# Patient Record
Sex: FEMALE | Race: WHITE
[De-identification: names, ages, dates, MRNs, and addresses within clinical notes are randomized per-mention and may not be internally consistent; named-entity substitution may affect disease eponyms.]

---

## 2018-12-10 ENCOUNTER — HOSPITAL ENCOUNTER (OUTPATIENT)
Dept: HOSPITAL 97 - ER | Age: 53
Setting detail: OBSERVATION
LOS: 2 days | Discharge: HOME HEALTH | End: 2018-12-12
Attending: INTERNAL MEDICINE | Admitting: FAMILY MEDICINE
Payer: COMMERCIAL

## 2018-12-10 VITALS — BODY MASS INDEX: 26.2 KG/M2

## 2018-12-10 DIAGNOSIS — E87.1: ICD-10-CM

## 2018-12-10 DIAGNOSIS — F10.10: ICD-10-CM

## 2018-12-10 DIAGNOSIS — F41.8: ICD-10-CM

## 2018-12-10 DIAGNOSIS — F17.210: ICD-10-CM

## 2018-12-10 DIAGNOSIS — K76.0: ICD-10-CM

## 2018-12-10 DIAGNOSIS — G92: Primary | ICD-10-CM

## 2018-12-10 DIAGNOSIS — E46: ICD-10-CM

## 2018-12-10 DIAGNOSIS — E87.6: ICD-10-CM

## 2018-12-10 DIAGNOSIS — K21.9: ICD-10-CM

## 2018-12-10 DIAGNOSIS — I10: ICD-10-CM

## 2018-12-10 DIAGNOSIS — J44.9: ICD-10-CM

## 2018-12-10 LAB
ALBUMIN SERPL BCP-MCNC: 3.9 G/DL (ref 3.4–5)
ALP SERPL-CCNC: 82 U/L (ref 45–117)
ALT SERPL W P-5'-P-CCNC: 21 U/L (ref 12–78)
AST SERPL W P-5'-P-CCNC: 18 U/L (ref 15–37)
BUN BLD-MCNC: 10 MG/DL (ref 7–18)
GLUCOSE SERPLBLD-MCNC: 85 MG/DL (ref 74–106)
HCT VFR BLD CALC: 33.6 % (ref 36–45)
LIPASE SERPL-CCNC: 403 U/L (ref 73–393)
LYMPHOCYTES # SPEC AUTO: 1.4 K/UL (ref 0.7–4.9)
MAGNESIUM SERPL-MCNC: 1.5 MG/DL (ref 1.8–2.4)
MCH RBC QN AUTO: 34.7 PG (ref 27–35)
MCV RBC: 97.1 FL (ref 80–100)
METHAMPHET UR QL SCN: NEGATIVE
PMV BLD: 7.2 FL (ref 7.6–11.3)
POTASSIUM SERPL-SCNC: 3.3 MMOL/L (ref 3.5–5.1)
RBC # BLD: 3.47 M/UL (ref 3.86–4.86)
THC SERPL-MCNC: NEGATIVE NG/ML
TROPONIN (EMERG DEPT USE ONLY): < 0.02 NG/ML (ref 0–0.04)
UA COMPLETE W REFLEX CULTURE PNL UR: (no result)

## 2018-12-10 PROCEDURE — 76700 US EXAM ABDOM COMPLETE: CPT

## 2018-12-10 PROCEDURE — 83735 ASSAY OF MAGNESIUM: CPT

## 2018-12-10 PROCEDURE — 36415 COLL VENOUS BLD VENIPUNCTURE: CPT

## 2018-12-10 PROCEDURE — 97162 PT EVAL MOD COMPLEX 30 MIN: CPT

## 2018-12-10 PROCEDURE — 93005 ELECTROCARDIOGRAM TRACING: CPT

## 2018-12-10 PROCEDURE — 99285 EMERGENCY DEPT VISIT HI MDM: CPT

## 2018-12-10 PROCEDURE — 80307 DRUG TEST PRSMV CHEM ANLYZR: CPT

## 2018-12-10 PROCEDURE — 87086 URINE CULTURE/COLONY COUNT: CPT

## 2018-12-10 PROCEDURE — 71045 X-RAY EXAM CHEST 1 VIEW: CPT

## 2018-12-10 PROCEDURE — 81015 MICROSCOPIC EXAM OF URINE: CPT

## 2018-12-10 PROCEDURE — 80053 COMPREHEN METABOLIC PANEL: CPT

## 2018-12-10 PROCEDURE — 97116 GAIT TRAINING THERAPY: CPT

## 2018-12-10 PROCEDURE — 84100 ASSAY OF PHOSPHORUS: CPT

## 2018-12-10 PROCEDURE — 84484 ASSAY OF TROPONIN QUANT: CPT

## 2018-12-10 PROCEDURE — 96361 HYDRATE IV INFUSION ADD-ON: CPT

## 2018-12-10 PROCEDURE — 82140 ASSAY OF AMMONIA: CPT

## 2018-12-10 PROCEDURE — 96374 THER/PROPH/DIAG INJ IV PUSH: CPT

## 2018-12-10 PROCEDURE — 82550 ASSAY OF CK (CPK): CPT

## 2018-12-10 PROCEDURE — 80076 HEPATIC FUNCTION PANEL: CPT

## 2018-12-10 PROCEDURE — 70553 MRI BRAIN STEM W/O & W/DYE: CPT

## 2018-12-10 PROCEDURE — 81025 URINE PREGNANCY TEST: CPT

## 2018-12-10 PROCEDURE — 80048 BASIC METABOLIC PNL TOTAL CA: CPT

## 2018-12-10 PROCEDURE — 83690 ASSAY OF LIPASE: CPT

## 2018-12-10 PROCEDURE — 87088 URINE BACTERIA CULTURE: CPT

## 2018-12-10 PROCEDURE — 96375 TX/PRO/DX INJ NEW DRUG ADDON: CPT

## 2018-12-10 PROCEDURE — 70450 CT HEAD/BRAIN W/O DYE: CPT

## 2018-12-10 PROCEDURE — 97530 THERAPEUTIC ACTIVITIES: CPT

## 2018-12-10 PROCEDURE — 81003 URINALYSIS AUTO W/O SCOPE: CPT

## 2018-12-10 PROCEDURE — 85025 COMPLETE CBC W/AUTO DIFF WBC: CPT

## 2018-12-10 PROCEDURE — 84132 ASSAY OF SERUM POTASSIUM: CPT

## 2018-12-10 RX ADMIN — Medication SCH: at 21:00

## 2018-12-10 RX ADMIN — SODIUM CHLORIDE SCH MLS: 0.9 INJECTION, SOLUTION INTRAVENOUS at 23:35

## 2018-12-10 NOTE — ER
Nurse's Notes                                                                                     

 [facility]                                                                

Name: Edna Rainey                                                                             

Age: 53 yrs                                                                                       

Sex: Female                                                                                       

: 1965                                                                                   

MRN: [id number]                                                                                   

Arrival Date: 12/10/2018                                                                          

Time: 14:11                                                                                       

Account#: [id number]                                                                            

Bed 5                                                                                             

Private MD: Kael Sarah                                                                         

Diagnosis: Delirium due to known physiological condition;Wernicke's encephalopathy                

                                                                                                  

Presentation:                                                                                     

12/10                                                                                             

14:30 Presenting complaint: Patient states: i take a lot of medicine and i did not take it    hj  

      last Thursday since then i had an episode of confusion, and it comes and goes; denies       

      fever and chills;. Transition of care: patient was not received from another setting of     

      care. Onset of symptoms was December 10, 2018. Risk Assessment: Do you want to hurt         

      yourself or someone else? Patient reports no desire to harm self or others. Initial         

      Sepsis Screen: Does the patient meet any 2 criteria? No. Patient's initial sepsis           

      screen is negative. Does the patient have a suspected source of infection? No.              

      Patient's initial sepsis screen is negative. Care prior to arrival: None.                   

14:30 Method Of Arrival: Ambulatory                                                             

14:30 Acuity: DAYANARA 3                                                                           hj  

                                                                                                  

Triage Assessment:                                                                                

14:36 General: Appears in no apparent distress. uncomfortable, Behavior is calm, cooperative, hj  

      appropriate for age. Pain: Denies pain. Neuro: Level of Consciousness is awake, alert,      

      obeys commands, Oriented to person, place, time, Appropriate for age.                       

                                                                                                  

OB/GYN:                                                                                           

14:37 LMP N/A - Post-menopause                                                                hj  

                                                                                                  

Historical:                                                                                       

- Allergies:                                                                                      

14:36 No Known Allergies;                                                                     hj  

- Home Meds:                                                                                      

14:36 hydrocodone-acetaminophen  mg oral tab 1 tab every 6 hours [Active];              hj  

      Tylenol-Codeine #3 300-30 mg oral tab 1 tab every 4-6 hours [Active]; cyclobenzaprine       

      10 mg Oral tab 1 tab daily [Active]; estradiol 4 mg Oral tab 1 tab once daily [Active];     

      losartan-hydrochlorothiazide 100-12.5 mg Oral tab 1 tab once daily [Active];                

- PMHx:                                                                                           

14:36 abscess in the GI tract; Arthritis; chron's disease; Colitis; Degenerative disc         hj  

      disease; Diverticulitis;                                                                    

- PSHx:                                                                                           

14:36 cyst;                                                                                   hj  

                                                                                                  

- Immunization history:: Adult Immunizations up to date.                                          

- Social history:: Smoking status: Patient uses tobacco products, Patient uses alcohol.           

- Ebola Screening: : Patient negative for fever greater than or equal to 101.5 degrees            

  Fahrenheit, and additional compatible Ebola Virus Disease symptoms Patient denies               

  exposure to infectious person Patient denies travel to an Ebola-affected area in the            

  21 days before illness onset.                                                                   

- Family history:: not pertinent.                                                                 

- Hospitalizations: : No recent hospitalization is reported.                                      

                                                                                                  

                                                                                                  

Screenin:36 Abuse screen: Denies threats or abuse. Denies injuries from another. Nutritional        hj  

      screening: No deficits noted. Tuberculosis screening: No symptoms or risk factors           

      identified. Fall Risk None identified.                                                      

                                                                                                  

Assessment:                                                                                       

15:50 General: Appears in no apparent distress. comfortable, well developed, Behavior is      sv  

      calm, cooperative. General: Reports last alcoholic drink was this morning. Pain: Denies     

      pain. Neuro: Level of Consciousness is awake, alert, obeys commands, Oriented to            

      person, place, time, Moves all extremities. Full function Speech is normal, Reports         

      episode of confusion this morning around 1000.. Cardiovascular: Patient's skin is warm      

      and dry. Rhythm is sinus rhythm. Respiratory: Airway is patent Respiratory effort is        

      even, unlabored, Respiratory pattern is regular, symmetrical. Derm: Skin is normal.         

16:36 Reassessment: Patient appears in no apparent distress at this time. No changes from     sv  

      previously documented assessment. Patient and/or family updated on plan of care and         

      expected duration. Pain level reassessed. Patient is alert, oriented x 3, equal             

      unlabored respirations, skin warm/dry/pink. Pt requesting water and food. Ok by Dr Fitch for pt to eat and drink. Pt given water and a chicken salad sandwich.                 

17:22 Reassessment: Patient appears in no apparent distress at this time. No changes from     sv  

      previously documented assessment. Patient and/or family updated on plan of care and         

      expected duration. Pain level reassessed. Patient is alert, oriented x 3, equal             

      unlabored respirations, skin warm/dry/pink.                                                 

19:11 General: Appears in no apparent distress. comfortable, Behavior is calm, cooperative.   ea  

      Pain: Denies pain. Neuro: Level of Consciousness is awake, alert, obeys commands,           

      Oriented to person, place, time. Cardiovascular: Patient's skin is warm and dry.            

      Cardiovascular: Heart tones S1 S2 present. Respiratory: Airway is patent Respiratory        

      effort is even, unlabored, Respiratory pattern is regular, symmetrical. Respiratory:        

      Breath sounds are clear bilaterally. Derm: Skin is dry, Skin is normal, Skin                

      temperature is warm.                                                                        

                                                                                                  

Vital Signs:                                                                                      

14:37  / 123; Pulse 87; Resp 18; Temp 98.2(TE); Pulse Ox 100% on R/A; Weight 69.4 kg;   hj  

      Height 5 ft. 4 in. (162.56 cm); Pain 0/10;                                                  

15:45  / 89; Pulse 81; Resp 15; Pulse Ox 100% on R/A;                                   sv  

16:00  / 98; Pulse 83; Resp 15; Pulse Ox 100% ;                                         sv  

16:36  / 92; Pulse 91; Resp 16; Pulse Ox 99% ;                                          sv  

17:21  / 73; Pulse 89; Resp 15; Pulse Ox 91% on R/A;                                    sv  

17:56  / 87; Pulse 91; Resp 20; Pulse Ox 100% on 2 lpm NC;                              sv  

18:34  / 89; Pulse 94; Resp 15; Pulse Ox 99% on 2 lpm NC;                               sv  

19:03  / 89; Pulse 100; Resp 18; Pulse Ox 98% ;                                         ea  

14:37 Body Mass Index 26.26 (69.40 kg, 162.56 cm)                                             hj  

17:21 Pt placed on O2 \T\ 2L per NC. O2 sat up to 95%.                                          sv  

                                                                                                  

ED Course:                                                                                        

14:11 Patient arrived in ED.                                                                  sb2 

14:11 Kael Sarah MD is Private Physician.                                                 sb2 

14:34 Triage completed.                                                                       hj  

14:37 Arm band placed on left wrist.                                                          hj  

14:39 Patient has correct armband on for positive identification. Bed in low position. Call   hj  

      light in reach. Side rails up X 1. Adult w/ patient.                                        

14:45 Fitz Fitch MD is Attending Physician.                                                rn  

15:16 CT Head Brain wo Cont In Process Unspecified.                                           EDMS

15:29 X-ray completed. Portable x-ray completed in exam room. Patient tolerated procedure     az  

      well.                                                                                       

15:31 XRAY Chest (1 view) In Process Unspecified.                                             EDMS

15:31 EKG done, by EKG tech. reviewed by Fitz Fitch MD.                                      dt2 

15:34 Rox Betancourt, RN is Primary Nurse.                                                  sv  

15:50 Initial lab(s) drawn, by me, sent to lab. Inserted saline lock: 22 gauge in left hand,  sv  

      using aseptic technique. Blood collected. Flushed left hand with 5 ml normal saline.        

17:50 Manuela Masters MD is Hospitalizing Provider.                                           rn  

18:59 Report given to Meli RN and Breanne ROJAS.                                                  sv  

19:00 Primary Nurse role handed off by Rox Betancourt RN                                   sv  

19:00 No provider procedures requiring assistance completed. Patient admitted, IV remains in  sv  

      place. intact.                                                                              

19:04 Breanne Oleary RN is Primary Nurse.                                                    ea  

                                                                                                  

Administered Medications:                                                                         

16:07 Drug: NS 0.9% 1000 ml Route: IV; Rate: 1000 ml; Site: left hand;                        sv  

17:55 Follow up: Response: No adverse reaction; IV Status: Completed infusion; IV Intake:     sv  

      1000ml                                                                                      

16:36 Drug: Banana Bag - (NS 0.9% 1000 ml, foLIC Acid 1 mg, Thiamine 100 mg, Multivitamin 1   sv  

      amp) Route: IV; Rate: calculated rate; Site: left hand;                                     

17:15 Drug: Valium 10 mg Route: IVP; Site: left hand;                                         sv  

17:30 Follow up: Response: No adverse reaction                                                sv  

18:45 Not Given (not to give per Dr Fitch): cloNIDine 0.2 mg PO once                          sv  

                                                                                                  

                                                                                                  

Intake:                                                                                           

17:55 IV: 1000ml; Total: 1000ml.                                                              sv  

                                                                                                  

Outcome:                                                                                          

17:50 Decision to Hospitalize by Provider.                                                    rn  

20:00 Admitted to Med/surg accompanied by tech, via wheelchair, with chart, Report called to  raul Cooper                                                                                       

20:00 Condition: stable                                                                           

20:00 Instructed on the need for admit.                                                           

20:05 Patient left the ED.                                                                    ak1 

                                                                                                  

Signatures:                                                                                       

Dispatcher [employer]                           EDMS                                                 

Rox Betancourt RN RN sv Nieto, Roman, MD MD rn Krenek, Amber, RN RN   ak1                                                  

Ja Enriquez RN RN                                                      

Breanne Oleary RN RN ea Billeau, Sheri sb2 Teague, Danielle                             dt2                                                  

Ayde Clancy                                                   

                                                                                                  

Corrections: (The following items were deleted from the chart)                                    

14:40 14:37 Pulse 87bpm; Resp 18bpm; Pulse Ox 100% RA; Temp 98.2F Temporal; 69.4 kg; Height 5 hj  

      ft. 4 in.; BMI: 26.2; Pain 0/10; hj                                                         

16:22 13:50 Inserted saline lock: 22 gauge in left hand, using aseptic technique. Blood       sv  

      collected. Flushed left hand with 5 ml normal saline sv                                     

: 13:50 Initial lab(s) drawn, by me, sent to lab. sv                                      sv  

                                                                                                  

**************************************************************************************************

## 2018-12-10 NOTE — XMS REPORT
Clinical Summary

 Created on:December 10, 2018



Patient:Edna Rainey

Sex:Female

:1965

External Reference #:[id number]





Demographics







 Address  [address] [place] [address]

 

 Home Phone  [phone]

 

 Work Phone  [phone]

 

 Mobile Phone  [phone]

 

 Email Address  [email or web address]

 

 Preferred Language  English

 

 Marital Status  

 

 Confucianism Affiliation  Unknown

 

 Race  White

 

 Ethnic Group  Not  or 









Author







 Organization  [facility]

 

 Address  [address] [place] [address]









Support







 Name  Relationship  Address  Phone

 

 Kelsie Rainey  Unavailable  Unavailable  [phone]









Care Team Providers







 Name  Role  Phone

 

 Asked, No Pcp  Primary Care Provider  Unavailable









Allergies







 Active Allergy  Reactions  Severity  Noted Date  Comments

 

 No Known Drug Allergies      2016  







Medications







 Medication  Sig  Dispensed  Refills  Start Date  End Date  Status

 

 escitalopram  Take 20 mg by    0      Active



 (LEXAPRO) 20 MG  mouth every          



 tablet  morning.          

 

 estradiol (ESTRACE)  Take 2 mg by    0      Active



 1 MG tablet  mouth every          



   morning.          

 

 metroNIDAZOLE  Take 500 mg    0      Active



 (FLAGYL) 500 MG  by mouth 3          



 tablet  (three) times          



   a day.          

 

 levofloxacin  Take 500 mg    0      Active



 (LEVAQUIN) 500 MG  by mouth          



 tablet  daily.          

 

 losartan-hydrochloro  Take 1 tablet    0      Active



 thiazide (HYZAAR)  by mouth          



 100-12.5 mg per  every          



 tablet  morning.          

 

 omeprazole  Take 40 mg by    0      Active



 (PriLOSEC) 40 MG  mouth every          



 capsule  evening.          

 

 progesterone  Take 100 mg    0      Active



 (PROMETRIUM) 100 MG  by mouth          



 capsule  nightly.          

 

 TURMERIC (CURCUMIN      0      Active



 MISC)            

 

 ferrous sulfate 325  Take 325 mg    0      Active



 (65 FE) MG tablet  by mouth          



   after lunch -          



   one time.          

 

 umeclidinium-vilante  Inhale 1 puff    0      Active



 rol (ANORO ELLIPTA)  as needed.          



 62.5-25            



 mcg/actuation            



 blister with device            

 

 FLUORIDE, SODIUM,  Apply to    0      Active



 (PREVIDENT DENT)  teeth.          

 

 fluticasone  2 sprays by    0      Active



 (FLONASE) 50  Each Nare          



 mcg/actuation nasal  route          



 spray  nightly.          

 

 VITAMIN D2 50,000  TAKE 1  40 capsule  0  2018  Active



 unit  CAPSULE        9  



 capsuleIndications:  (50,000 UNITS          



 Vitamin D deficiency  TOTAL) BY          



   MOUTH ONCE A          



   WEEK FOR 40          



   DOSES.          

 

 mesalamine (LIALDA)  TAKE 4  120 tablet  0  12/10/2018    Active



 1.2 gram EC tablet  TABLETS BY          



   MOUTH EVERY          



   DAY          

 

 sod picosulf-mag  Use as  1 each  0  2017  Discontinued



 ox-citric ac 10  directed.        8  



 mg-3.5 gram-12 gram            



 powder in packet            

 

 ergocalciferol  Take 1  40 capsule  0  2017  Discontinued



 (VITAMIN D2) 50,000  capsule        8  



 unit  (50,000 Units          



 capsuleIndications:  total) by          



 Vitamin D deficiency  mouth once a          



   week for 40          



   doses.          

 

 mesalamine (LIALDA)  TAKE 4  120 tablet  3  2017  Discontinued



 1.2 gram EC tablet  TABLETS BY        8  



   MOUTH EVERY          



   DAY          

 

 mesalamine (LIALDA)  TAKE 4  120 tablet  3  2018  Discontinued



 1.2 gram EC tablet  TABLETS BY        8  



   MOUTH EVERY          



   DAY          

 

 mesalamine (LIALDA)  TAKE 4  120 tablet  3  2018  Discontinued



 1.2 gram EC tablet  TABLETS BY        8  



   MOUTH EVERY          



   DAY          







Active Problems







 Problem  Noted Date

 

 Closed displaced fracture of fifth metatarsal bone of left foot  2018

 

 Sprain of anterior talofibular ligament of left ankle  2018

 

 Sesamoiditis  2018

 

 Acquired hammertoe of left foot  2018

 

 Indeterminate colitis  2017

 

 Iron deficiency anemia  2017









 Overview: 







 2018 Regulatory







Encounters







 Date  Type  Specialty  Care Team  Description

 

 12/10/2018  Or[facility] Only  [facility]  [facility]  [facility]inflammatory bowel



       Carmen, LVN  disease) (Primary Dx)

 

 2018  Refill  Gastroenterology  Dania Harp PA  

 

 2018  Office Visit  Orthopedic Surgery  [facility],  Closed displaced 
fracture of fifth metatarsal bone of left foot, initial encounter (Primary Dx);



       Ahmet SMITH MD  Sprain of anterior talofibular ligament of left ankle, 
initial encounter

 

 2018  Refill  GastroenterDania Vance PA  

 

 2018  Office Visit  Orthopedic Surgery  [facility],  Acquired hammertoe 
of left foot (Primary Dx);



       Ahmet SMITH MD  Sesamoiditis

 

 2018  Office Visit  Orthopedic Surgery  Jesus,  Acquired hammertoe 
of



       Ahmet SMTIH MD  left foot (Primary Dx)

 

 2018  Orders Only  Orthopedic Surgery  Adriel Colindres MA  

 

 2018  Refill  Gastroenterology  Loyd,  Vitamin D deficiency



       NAFISA Parmar  

 

 05/10/2018  Telephone    Philip Smith MD  

 

 2018  Telephone    Philip Smith MD  

 

 2018  [facility]  



       Aparna, VIKRAM  

 

 2018  Surgery  Orthopedic Surgery  Cossteven,  Left Hallux MP fusion,



       Ahmet SMITH MD  2nd metatarsophalangeal



         joint capsulotomy, 2nd



         metatarsal shortening



         osteotomy, 2nd hammertoe



         correction and hardware



         removal

 

 2018  [facility]  Orthopedic Surgery  Mala Leeamoiditis (Primary 
Dx);



   Encounter    Ahmet SMITH MD  Acquired hammertoe of left foot

 

 2018  Pre-Admit Testing  Pre-Admission Testing  Jesus,  
Preoperative testing



   Appointment    Ahmet SMITH MD  (Primary Dx)

 

 2018  Orders Only  Orthopedic Surgery  Adriel Colindres MA  

 

 2018  Orders Only  Orthopedic Surgery  Adriel Colindres MA  

 

 2018  Orders Only  Orthopedic Surgery  Bernadine,  Sesamoiditis (Primary 
Dx);



       DAVID Morel  Acquired hammertoe of left foot

 

 2018  Office Visit  Orthopedic Surgery  Jesus  Sesamoiditis (
Primary



       Ahmet SMITH MD  Dx)

 

 2018  Refill  Gastroenterology  Loyd,  



       NAFISA Parmar  

 

 2018  Transcribe Orders  Physical Therapy  Caleb,  Constipation by 
outlet dysfunction (Primary Dx);



       Dhara  Complete fecal incontinence



       MD Aria  

 

 2018  Anesthesia Event  Gastroenterology  Fitz Alvarado MD  

 

 2018  Surgery  Gastroenterology  Caleb,  COLONOSCOPY



       Dhara Knapp MD  

 

 2018  Surgery  Gastroenterology  Caleb,  MANOMETRY, ANORECTAL



       Dhara Knapp MD  

 

 2018  Hospital  Gastroenterology  Caleb,  



   Encounter    Dhara Knapp MD  



after 2017



Family History







 Medical History  Relation  Name  Comments

 

 Other  Maternal Aunt    AMYLOIDOSIS

 

 Celiac disease  Neg Hx    

 

 Colon cancer  Neg Hx    

 

 Inflammatory bowel disease  Neg Hx    

 

 Liver disease  Neg Hx    









 Relation  Name  Status  Comments

 

 Maternal Aunt      







Social History







 Tobacco Use  Types  Packs/Day  Years Used  Date

 

 Current Every Day Smoker    1  30  









 Smokeless Tobacco: Never Used      









 Tobacco Cessation: Ready to Quit: No; Counseling Given: No



 Comments: patient declined information









 Alcohol Use  Drinks/Week  oz/Week  Comments

 

 Yes  8 Standard drinks or equivalent  4.8  









 Sex Assigned at Birth  Date Recorded

 

 Not on file  









 Job Start Date  Occupation  Industry

 

 Not on file  Not on file  Not on file









 Travel History  Travel Start  Travel End









 No recent travel history available.







Last Filed Vital Signs







 Vital Sign  Reading  Time Taken

 

 Blood Pressure  145/75  2018 12:53 PM CDT

 

 Pulse  72  2018 12:53 PM CDT

 

 Temperature  37.1 C (98.7 F)  2018 12:53 PM CDT

 

 Respiratory Rate  19  2018 12:53 PM CDT

 

 Oxygen Saturation  98%  2018 12:53 PM CDT

 

 Inhaled Oxygen Concentration  -  -

 

 Weight  72.5 kg (159 lb 12.8 oz)  2018  6:32 AM CDT

 

 Height  160 cm (5' 3")  2018  6:32 AM CDT

 

 Body Mass Index  28.31  2018  6:32 AM CDT







Plan of Treatment







 Date  Type  Specialty  Care Team  Description

 

 2019  Office Visit  Orthopedic Surgery  Ahmet Lee MD



  



       [address] [facility]



  



       [address]



  



       [place] [address] [phone] (Fax)  









 Health Maintenance  Due Date  Last Done  Comments

 

 CERVICAL CANCER SCREENING  1988    

 

 BREAST CANCER SCREENING  2017    

 

 COLON CANCER SCREENING  2017    

 

 SHINGRIX VACCINE (1 of 2)  2017    

 

 INFLUENZA VACCINE  2018    

 

 HEPATITIS B VACCINES  Aged Out    No longer eligible based on patient's



       age to complete this topic

 

 IPV VACCINES  Aged Out    No longer eligible based on patient's



       age to complete this topic

 

 MENINGOCOCCAL VACCINE  Aged Out    No longer eligible based on patient's



       age to complete this topic







Implants







 Implanted  Type  Area    Device  Shelf  Model / Serial /



         Identifier  Expiration  Lot



           Date  

 

 2.8 Cortical Screw 16mm, Hd7, 1 - [id number]  IPM IMPLANT  Left:  MEDARTIS    
  A [id number]  /



 Implanted: Qty: 1 on 2018 by Ahmet Lee MD  DEVICES  Foot   
      /

 

 2.8 Cortical Screw 18mm, Hd7, 1 - [id number]  IPM IMPLANT  Left:  MEDARTIS    
  A [id number] 18 /



 Implanted: Qty: 2 on 2018 by Ahmet Lee MD  DEVICES  Foot   
      /

 

 2.8 Trilock Mtp Fusion Pl, 0 Dorsifl.,L - [id number]  IPM IMPLANT  Left:  
MEDARTIS      A [id number] 11 /



 Implanted: Qty: 1 on 2018 by Ahmet Lee MD  DEVICES  Foot   
      /

 

 Screw, Headless Compression, 2.5mm X 26mm, Ti - [id number]  IPM IMPLANT  Left:
  SKELETAL      [id number]            /



 Implanted: Qty: 1 on 2018 by Ahmet Lee MD  DEVICES  Foot  
DYNAMICS       /

 

 K-Wire,Standard Tip, 0.9 Mm X 152 Mm, Ss - [id number]  IPM IMPLANT  Left:  
SKELETAL      [id number] /



 Implanted: Qty: 3 on 2018 by Ahmet Lee MD  DEVICES  Foot  
DYNAMICS       /

 

 1.5x8mm Non Locking Cortical Screw, Alps - [id number]  IPM IMPLANT  Left:  
BIOMET TRAUMA      [id number] /



 Implanted: Qty: 1 on 2018 by Ahmet Lee MD  DEVICES  Foot   
      /

 

 1.5 X 14mm Non Locking Screw - [id number]  IPM IMPLANT  Left:  BIOMET TRAUMA  
    1312 20 514 /



 Implanted: Qty: 3 on 2018 by Ahmet Lee MD  DEVICES  Foot   
      /

 

 1.5 X 16mm Non Locking Screw - [id number]  IPM IMPLANT  Left:  BIOMET TRAUMA  
    [id number] /



 Implanted: Qty: 1 on 2018 by Ahmet Lee MD  DEVICES  Foot   
      /

 

 2.8 Trilock Screw 16mm, Hd7, 1/ - [id number]  IPM IMPLANT  Left:  MEDARTIS    
  A [id number] 16 /



 Implanted: Qty: 1 on 2018 by Ahmet Lee MD  DEVICES  Foot   
      /

 

 2.8 Trilock Screw 18mm, Hd7, 1/ - [id number]  IPM IMPLANT  Left:  MEDARTIS    
  A [id number] 18 /



 Implanted: Qty: 2 on 2018 by Ahmet Lee MD  DEVICES  Foot   
      /

 

 2.8 Cortical Screw 14mm, Hd7, 1 - [id number]  IPM IMPLANT  Left:  MEDARTIS    
  A 5800  /



 Implanted: Qty: 1 on 2018 by Ahmet Lee MD  DEVICES  Foot   
      /

 

 1.5mm A.L.P.S. Hand Locking Plate, Y-Shape  Ortho Plate  Left:  BIOMET SPINE, 
     [id number] /



 Implanted: Qty: 1 on 2018 by Ahmet Lee MD    Foot  TRAUMA, 
BRACING,       /



       OSTEO (FORMERLY      



       [employer])      

 

 3.5mm X 30mm Orthosolutions Cannulated Screw Self Drill Tap  Ortho Screw  Left
:        [id number] /



 Implanted: Qty: 1 on 2018 by Ahmet Lee MD    Foot         /

 

 Putty Dbm Dbx 1cc - [id number] - [id number]  Orthopedic  Left:  
MUSCULOSKELETAL    2020  [id number] /



 Implanted: Qty: 1 on 2018 by Ahmet Lee MD  Trauma  Foot  
TRANSPLANT      [id number] /



   Implants    FOUNDATION      LOT NA

 

 Screw Bone N-Freddie 1.5x12mm - [id number]  Orthopedic  Left:        [id number] /



 Implanted: Qty: 1 on 2018 by Ahmet Lee MD  Trauma  Foot    
     /



   Implants          

 

 Wire K Dual Trcr Pt 0.396u2hz Ns - [id number]  Temporary  Left:  MICRO AIRE   
   1600 435NS /



 Implanted: Qty: 2 on 2018 by Ahmet Lee MD  Fixation  Foot  
SURGICAL       /



   Pin or Wire    INSTRUMENT      

 

 Wire K Dual Trcr Pt 0.584o0bp Ns - [id number]  Temporary  Left:  MICRO AIRE   
   1600 445NS /



 Implanted: Qty: 2 on 2018 by Ahmet Lee MD  Fixation  Foot  
SURGICAL       /



   Pin or Wire    INSTRUMENT      









 Explanted  Type  Area    Device  Shelf  Model /



         Identifier  Expiration  Serial /



           Date  Lot

 

 2.8 Trilock Screw 20mm, Hd7, 1/ - [id number]  IPM IMPLANT  Left:  MEDARTIS    
  A 5850  /



 Implanted: 2018 (Quantity not on file)  DEVICES  Foot         /



 Explanted: Qty: 1 on 2018            







Procedures







 Procedure Name  Priority  Date/Time  Associated Diagnosis  Comments

 

 XR FOOT 3+ VW LEFT  Routine  2018 10:29 AM  Acquired hammertoe  Results 
for this



     CDT  of left foot



  procedure are in



       Sesamoiditis  the results



         section.

 

 FUSION, JOINT, MTP    2018  8:30 AM  Sesamoiditis



  



     CDT  Acquired hammertoe  



       of left foot  









   Special Needs







   1.5 HRS, GENERAL WITH POPLITEAL, SUPINE, MINI C-ARM, SAGITTAL SAW, 
ORTHOSOLUTIONS



   ULTOS PLATING SYSTEM AND FOOT RECONSTRUCTION SYSTEM (FRS), MEDARTIS APTUS 
FOOT SET



   (MP FUSION)









 NC AN PERIPHERAL BLOCK PROCEDURE FOR PAIN  Routine  2018  7:55 AM CDT    









   Procedure Note - Kristie Richard MD - 2018  7:55 AM CDT



Peripheral Block



   Performed by: KRISTIE RICHARD



   Authorized by: KRISTIE RICHARD



   



   Patient Location:  Block room



   Start Time:  2018 7:51 AM



   End Time:  2018 7:53 AM



   Reason for Block: at surgeon's request, post-op pain management, procedure 
for pain



   Staff:



     Anesthesiologist:  KRISTIE RICHARD



     Performed by:  Anesthesiologist



   Preprocedure: patient identified, IV checked, site and side verified, risks 
and benefits discussed, procedure verified, surgical consent complete, patient 
position confirmed, monitors and equipment checked,



   pre-op evaluation complete and site marked



   Time Out Performed:  2018 7:37 AM



   Peripheral Nerve Block:



     Patient Position:  Supine



     Prep: ChloraPrep and patient draped



     Monitoring:  Continuous pulse oximetry, blood pressure monitoring and 
heart rate



   Block Type:  Saphenous



   Laterality:  Left



   Injection Technique:  Single injection



   Procedures: ultrasound guided



   Ultrasound documentation:  Images saved on portable media and printed/placed 
in chart



   Local Infiltration (See MAR for details):  Lidocaine



   Needle:



     Needle Type:  Pajunk



     Needle Gauge:  22 G



     Needle Length:  8 cm



   Assessment:



     Injection Assessment:  No symptoms of intraneural/intravenous injection, 
intermittent aspiration during local anesthetic administration and visualized 
pertinent vascular structures and nerves



     Paresthesia Pain:  None



     Heart Rate Change: No



     Slow Fractionated Injection: Yes



     Block outcome:  No apparent complications, patient comfortable and patient 
tolerated procedure well









 NC AN PERIPHERAL BLOCK POST-OP PAIN  Routine  2018  7:55 AM CDT    









   Procedure Note - Kristie Richard MD - 2018  7:55 AM CDT



Peripheral Block



   Performed by: KRISTIE RICHARD



   Authorized by: KRISTIE RICHARD



   



   Patient Location:  Block room



   Start Time:  2018 7:51 AM



   End Time:  2018 7:53 AM



   Reason for Block: at surgeon's request, post-op pain management, procedure 
for pain



   Staff:



     Anesthesiologist:  KRISTIE RICHARD



     Performed by:  Anesthesiologist



   Preprocedure: patient identified, IV checked, site and side verified, risks 
and benefits discussed, procedure verified, surgical consent complete, patient 
position confirmed, monitors and equipment checked,



   pre-op evaluation complete and site marked



   Time Out Performed:  2018 7:37 AM



   Peripheral Nerve Block:



     Patient Position:  Supine



     Prep: ChloraPrep and patient draped



     Monitoring:  Continuous pulse oximetry, blood pressure monitoring and 
heart rate



   Block Type:  Saphenous



   Laterality:  Left



   Injection Technique:  Single injection



   Procedures: ultrasound guided



   Ultrasound documentation:  Images saved on portable media and printed/placed 
in chart



   Local Infiltration (See MAR for details):  Lidocaine



   Needle:



     Needle Type:  Pajunk



     Needle Gauge:  22 G



     Needle Length:  8 cm



   Assessment:



     Injection Assessment:  No symptoms of intraneural/intravenous injection, 
intermittent aspiration during local anesthetic administration and visualized 
pertinent vascular structures and nerves



     Paresthesia Pain:  None



     Heart Rate Change: No



     Slow Fractionated Injection: Yes



     Block outcome:  No apparent complications, patient comfortable and patient 
tolerated procedure well









 NC AN PERIPHERAL BLOCK PROCEDURE FOR PAIN  Routine  2018  7:54 AM CDT    









   Procedure Note - Kristie Richard MD - 2018  7:54 AM CDT



Peripheral Block



   Performed by: KRISTIE RICHARD



   Authorized by: KRISTIE RICHARD



   



   Patient Location:  Block room



   Start Time:  2018 7:40 AM



   End Time:  2018 7:48 AM



   Reason for Block: at surgeon's request, post-op pain management, procedure 
for pain



   Staff:



     Anesthesiologist:  KRISTIE RICHARD



     Performed by:  Anesthesiologist



   Preprocedure: patient identified, IV checked, site and side verified, risks 
and benefits discussed, procedure verified, surgical consent complete, patient 
position confirmed, monitors and equipment checked,



   pre-op evaluation complete and site marked



   Time Out Performed:  2018 7:37 AM



   Peripheral Nerve Block:



     Patient Position:  Right lateral decubitus



     Prep: ChloraPrep and patient draped



     Monitoring:  Continuous pulse oximetry, blood pressure monitoring and 
heart rate



   Block Type:  Sciatic



   Laterality:  Left



   Injection Technique:  Catheter insertion



   Procedures: ultrasound guided and nerve stimulator



   Ultrasound documentation:  Images saved on portable media and printed/placed 
in chart



   Local Infiltration (See MAR for details):  Lidocaine



   Loss of Twitch:  0.5 mA



   Needle:



     Needle Type:  Pajunk



     Needle Gauge:  19 G



     Needle Length:  10 cm



     Catheter size: 20G.



     Catheter at Skin Depth:  6 cm



   Assessment:



     Injection Assessment:  Visualized needle/local anesthetic surrounding nerve
, intermittent aspiration during local anesthetic administration, visualized 
pertinent vascular structures and nerves, needle tip visualized



   at all times during injection of medication and no symptoms of intraneural/
intravenous injection



     Paresthesia Pain:  None



     Heart Rate Change: No



     Slow Fractionated Injection: Yes



     Block outcome:  No apparent complications, patient comfortable and patient 
tolerated procedure well









 NC AN PERIPHERAL BLOCK POST-OP PAIN  Routine  2018  7:54 AM CDT    









   Procedure Note - Kristie Richard MD - 2018  7:54 AM CDT



Peripheral Block



   Performed by: KRISTIE RICHARD



   Authorized by: KRISTIE RICHARD



   



   Patient Location:  Block room



   Start Time:  2018 7:40 AM



   End Time:  2018 7:48 AM



   Reason for Block: at surgeon's request, post-op pain management, procedure 
for pain



   Staff:



     Anesthesiologist:  KRISTIE RICHARD



     Performed by:  Anesthesiologist



   Preprocedure: patient identified, IV checked, site and side verified, risks 
and benefits discussed, procedure verified, surgical consent complete, patient 
position confirmed, monitors and equipment checked,



   pre-op evaluation complete and site marked



   Time Out Performed:  2018 7:37 AM



   Peripheral Nerve Block:



     Patient Position:  Right lateral decubitus



     Prep: ChloraPrep and patient draped



     Monitoring:  Continuous pulse oximetry, blood pressure monitoring and 
heart rate



   Block Type:  Sciatic



   Laterality:  Left



   Injection Technique:  Catheter insertion



   Procedures: ultrasound guided and nerve stimulator



   Ultrasound documentation:  Images saved on portable media and printed/placed 
in chart



   Local Infiltration (See MAR for details):  Lidocaine



   Loss of Twitch:  0.5 mA



   Needle:



     Needle Type:  Pajunk



     Needle Gauge:  19 G



     Needle Length:  10 cm



     Catheter size: 20G.



     Catheter at Skin Depth:  6 cm



   Assessment:



     Injection Assessment:  Visualized needle/local anesthetic surrounding nerve
, intermittent aspiration during local anesthetic administration, visualized 
pertinent vascular structures and nerves, needle tip visualized



   at all times during injection of medication and no symptoms of intraneural/
intravenous injection



     Paresthesia Pain:  None



     Heart Rate Change: No



     Slow Fractionated Injection: Yes



     Block outcome:  No apparent complications, patient comfortable and patient 
tolerated procedure well









 POC PANEL 4  Routine  2018  6:23    Results for this



     AM CDT    procedure are in



         the results



         section.

 

 ZZESTIMATED GFR  Routine  2018  5:45    Results for this



     AM CDT    procedure are in



         the results



         section.

 

 BASIC METABOLIC PANEL  Routine  2018  5:45    Results for this



     AM CDT    procedure are in



         the results



         section.

 

 ECG 12-LEAD  Routine  2018 10:00  Preoperative testing  Results for this



     AM CDT    procedure are in



         the results



         section.

 

 ZZESTIMATED GFR  Routine  2018  9:45    Results for this



     AM CDT    procedure are in



         the results



         section.

 

 COMPREHENSIVE  Routine  2018  9:45  Preoperative testing  Results for 
this



 METABOLIC PANEL    AM CDT    procedure are in



         the results



         section.

 

 CBC HEMOGRAM  Routine  2018  9:45  Preoperative testing  Results for this



     AM CDT    procedure are in



         the results



         section.

 

 XR FOOT 3+ VW LEFT  Routine  2018  2:44  Sesamoiditis  Results for this



     PM CDT    procedure are in



         the results



         section.

 

 SURGICAL PATHOLOGY  Routine  2018  2:07    Results for this



 REQUEST    PM CST    procedure are in



         the results



         section.

 

 COLONOSCOPY    2018 12:00  Colitis,  



     PM CST  indeterminate  

 

 MANOMETRY, ANORECTAL    2018 10:00  Indeterminate colitis  



     AM CST    



after 2017



Results

XR Foot 3+ Vw Left (2018 10:29 AM CDT)Only the most recent of2 
resultswithin the time period is included.





 Narrative  Performed At

 

 This result has an attachment that is not available.



3 views of the left foot in weightbearing fashion shows a healing hallux  [facility] 
[facility]



 metatarsophalangeal joint fusion. The hardware is in place. There is no  



 evidence of breakage or loosening. There is also evidence of a second  



 metatarsal shortening osteotomy and a second hammertoe correction. The  



 hardware is in place. There is no evidence of breakage or loosening. There  



 appears to be some mild dorsal lateral subluxation of the second  



 metatarsophalangeal joint. There is a single retained anchor along the  



 navicular consistent with a navicular tuberosity excision.  









 Performing Organization  Address  City/[facility]/Zipcode  Phone Number

 

 [facility]  [address] [place] [address]  



POC panel 4 (2018  6:23 AM CDT)





 Component  Value  Ref Range  Performed At

 

 POC sodium  123 (L)  135 - 148 mmol/L  [facility] DEPARTMENT OF



       PATHOLOGY AND GENOMIC



       MEDICINE

 

 POC potassium  3.6  3.5 - 5.0 mmol/L  [facility] DEPARTMENT OF



       PATHOLOGY AND GENOMIC



       MEDICINE

 

 POC hematocrit  47  37 - 47 %  [facility] DEPARTMENT OF



   Comment:    PATHOLOGY AND GENOMIC



   Meter ID: [id number]    MEDICINE



   : [employer]    



       

 

 POC glucose  72  65 - 99 mg/dL  [facility] DEPARTMENT OF



       PATHOLOGY AND GENOMIC



       MEDICINE









 Performing Organization  Address  City[place]  [place] Number

 

 [facility] [facility] AND  [address] [place], [place] [id number]  



 GENOMIC [facility]      



Estimated GFR (2018  5:45 AM CDT)Only the most recent of2 resultswithin 
the time period is included.





 Component  Value  Ref Range  Performed At

 

 GFR Non Af Amer  66  mL/min/1.73 m2  [facility] DEPARTMENT OF



       PATHOLOGY AND GENOMIC



       MEDICINE

 

 GFR Af Amer  80  mL/min/1.73 m2  [facility] DEPARTMENT OF



   Comment:    PATHOLOGY AND GENOMIC



   Chronic kidney disease: <60 mL/min/1.73m2    MEDICINE



   Kidney failure: <15 mL/min/1.73m2    



   The estimated GFR is calculated from the IDMS-traceable Modification of Diet
    



   in Renal Disease Equation. The accuracy of the calculation is poor when the 
   



   creatinine is normal. Calculated values >90 mL/min/1.73m2 are not reported. 
   



   This equation has not been validated in children (<18 years), pregnant    



   women, the elderly (>70 years), or ethnic groups other than Caucasians and  
  



    Americans.    



       









 Specimen

 

 Plasma specimen









 Performing Organization  Address  City/[facility]/Zipcode  Phone Number

 

 [facility] AND  [address] [place] [address]  



 oneforty      



Basic metabolic panel (2018  5:45 AM CDT)





 Component  Value  Ref Range  Performed At

 

 Sodium  129 (L)  135 - 148 mEq/L  [facility] DEPARTMENT OF PATHOLOGY



       AND GENOMIC MEDICINE

 

 Potassium  4.1  3.5 - 5.0 mEq/L  [facility] DEPARTMENT OF PATHOLOGY



       AND GENOMIC MEDICINE

 

 Chloride  90 (L)  98 - 112 mEq/L  [facility] DEPARTMENT OF PATHOLOGY



       AND GENOMIC MEDICINE

 

 CO2  23 (L)  24 - 31 mEq/L  [facility] DEPARTMENT OF PATHOLOGY



       AND GENOMIC MEDICINE

 

 Anion gap  16 (H)  7 - 15 mEq/L  [facility] DEPARTMENT OF PATHOLOGY



   Comment:    AND GENOMIC MEDICINE



   Starting from  , anion gap calculation    



   no longer incorporates potassium. Please note the change.    



       

 

 BUN  13  6 - 20 mg/dL  [facility] DEPARTMENT OF PATHOLOGY



       AND GENOMIC MEDICINE

 

 Creatinine  0.9  0.5 - 0.9 mg/dL  [facility] DEPARTMENT OF PATHOLOGY



       AND GENOMIC MEDICINE

 

 Glucose  68  65 - 99 mg/dL  [facility] DEPARTMENT OF PATHOLOGY



       AND GENOMIC MEDICINE

 

 Calcium  9.2  8.3 - 10.2 mg/dL  [facility] DEPARTMENT OF PATHOLOGY



       AND GENOMIC MEDICINE









 Specimen

 

 Plasma specimen









 Performing Organization  Address  City/[facility]  Phone Number

 

 [facility] OF PATHOLOGY AND  [address] [place] [id number]  



 [facility] MEDICINE      



ECG 12 lead (2018 10:00 AM CDT)





 Component  Value  Ref Range  Performed At

 

 Ventricular rate  72    [facility] MUSE

 

 Atrial rate  72    [facility] MUSE

 

 NC interval  124    [facility] MUSE

 

 QRSD interval  70    [facility] MUSE

 

 QT interval  390    [facility] MUSE

 

 QTC interval  427    [facility] MUSE

 

 P axis 1  73    [facility] MUSE

 

 QRS axis 1  41    [facility] MUSE

 

 T wave axis  34    [facility] MUSE

 

 EKG impression  Normal sinus rhythm with sinus    [facility] MUSE



   arrhythmia-Normal ECG-No previous ECGs    



   available-Electronically Signed By Mane Minor MD ([id number]) on 2018 5:27:45 PM    









 Performing Organization  Address  City/[facility]  Phone Number

 

 [facility] MUSE  [address] [place] [address]  



CBC hemogram (2018  9:45 AM CDT)





 Component  Value  Ref Range  Performed At

 

 WBC  8.26  4.50 - 11.00 k/uL  [facility] DEPARTMENT OF PATHOLOGY AND GENOMIC



       MEDICINE

 

 RBC  3.93 (L)  4.20 - 5.50 m/uL  [facility] DEPARTMENT OF PATHOLOGY AND GENOMIC



       MEDICINE

 

 HGB  12.9  12.0 - 16.0 g/dL  [facility] DEPARTMENT OF PATHOLOGY AND GENOMIC



       MEDICINE

 

 HCT  39.0  37.0 - 47.0 %  [facility] DEPARTMENT OF PATHOLOGY AND GENOMIC



       MEDICINE

 

 MCV  99.2  82.0 - 100.0 fL  [facility] DEPARTMENT OF PATHOLOGY AND GENOMIC



       MEDICINE

 

 MCH  32.8  27.0 - 34.0 pg  [facility] DEPARTMENT OF PATHOLOGY AND GENOMIC



       MEDICINE

 

 MCHC  33.1  31.0 - 37.0 g/dL  [facility] DEPARTMENT OF PATHOLOGY AND GENOMIC



       MEDICINE

 

 RDW - SD  46.9  37.0 - 55.0 fL  [facility] DEPARTMENT OF PATHOLOGY AND GENOMIC



       MEDICINE

 

 MPV  9.1  8.8 - 13.2 fL  [facility] DEPARTMENT OF PATHOLOGY AND GENOMIC



       MEDICINE

 

 Platelet count  391  150 - 400 k/uL  [facility] DEPARTMENT OF PATHOLOGY AND GENOMIC



       MEDICINE

 

 Nucleated RBC  0.00  /100 WBC  [facility] DEPARTMENT OF PATHOLOGY AND GENOMIC



       MEDICINE









 Specimen

 

 Blood









 Performing Organization  Address  City/State/Zipcode  Phone Number

 

 [facility] DEPARTMENT OF PATHOLOGY AND  [address] [place] [address]  



 GENOMIC MEDICINE      



Comprehensive metabolic panel (2018  9:45 AM CDT)





 Component  Value  Ref Range  Performed At

 

 Sodium  129 (L)  135 - 148 mEq/L  [facility] DEPARTMENT OF



       PATHOLOGY AND GENOMIC



       MEDICINE

 

 Potassium  4.6  3.5 - 5.0 mEq/L  [facility] DEPARTMENT OF



       PATHOLOGY AND GENOMIC



       MEDICINE

 

 Chloride  92 (L)  98 - 112 mEq/L  [facility] DEPARTMENT OF



       PATHOLOGY AND GENOMIC



       MEDICINE

 

 CO2  23 (L)  24 - 31 mEq/L  [facility] DEPARTMENT OF



       PATHOLOGY AND GENOMIC



       MEDICINE

 

 Anion gap  14  7 - 15 mEq/L  [facility] DEPARTMENT OF



   Comment:    PATHOLOGY AND GENOMIC



   Starting from  , anion gap calculation    MEDICINE



   no longer incorporates potassium. Please note the change.    



       

 

 BUN  12  6 - 20 mg/dL  [facility] DEPARTMENT OF



       PATHOLOGY AND GENOMIC



       MEDICINE

 

 Creatinine  0.9  0.5 - 0.9 mg/dL  [facility] DEPARTMENT OF



       PATHOLOGY AND GENOMIC



       MEDICINE

 

 Glucose  87  65 - 99 mg/dL  [facility] DEPARTMENT OF



       PATHOLOGY AND GENOMIC



       MEDICINE

 

 Calcium  9.2  8.3 - 10.2 mg/dL  [facility] DEPARTMENT OF



       PATHOLOGY AND GENOMIC



       MEDICINE

 

 Protein  8.2  6.3 - 8.3 g/dL  [facility] DEPARTMENT OF



   Comment:    PATHOLOGY AND GENOMIC



    4.6-7.0 g/dL    MEDICINE



   1 week 4.4-7.6 g/dL    



   7 months-1year5.1-7.3 g/dL    



   1-2 years5.6-7.5 g/dL    



   >3 years6.0-8.0 g/dL    



    6.3-8.3 g/dL    



       

 

 Albumin  3.9  3.5 - 5.0 g/dL  [facility] DEPARTMENT OF



       PATHOLOGY AND GENOMIC



       MEDICINE

 

 A/G ratio  0.9  0.7 - 3.8  [facility] DEPARTMENT OF



       PATHOLOGY AND GENOMIC



       MEDICINE

 

 Alkaline phosphatase  83  35 - 104 U/L  [facility] DEPARTMENT OF



       PATHOLOGY AND GENOMIC



       MEDICINE

 

 AST  22  10 - 35 U/L  [facility] DEPARTMENT OF



       PATHOLOGY AND GENOMIC



       MEDICINE

 

 ALT  13  5 - 50 U/L  [facility] DEPARTMENT OF



       PATHOLOGY AND GENOMIC



       MEDICINE

 

 Total bilirubin  <0.2  0.0 - 1.2 mg/dL  [facility] DEPARTMENT OF



       PATHOLOGY AND GENOMIC



       MEDICINE









 Specimen

 

 Plasma specimen









 Performing Organization  Address  City/[facility]  Phone Number

 

 [facility] DEPARTMENT OF PATHOLOGY AND  [address] [place] [address]  



 GENOMIC MEDICINE      



Surgical pathology request (2018  2:07 PM CST)





 Component  Value  Ref Range  Performed At

 

 Case number  [id number]    [facility] DEPARTMENT OF



       PATHOLOGY AND GENOMIC



       MEDICINE

 

 Surgical pathology report  See link below for PDF    [facility] DEPARTMENT OF



   Lab Report    PATHOLOGY AND GENOMIC



       MEDICINE

 

 Result status  This is Final Report to    [facility] DEPARTMENT OF



   [id number]    PATHOLOGY AND GENOMIC



       MEDICINE









 Performing Organization  Address  City/[facility]  Phone Number

 

 [facility] DEPARTMENT OF PATHOLOGY AND  [address] [place] [address]  



 GENOMIC MEDICINE      



after 2017



Insurance







 Payer  Benefit Plan / Group  Subscriber ID  Type  Phone  Address

 

 [employer]  [employer] HMO,POS,EPO, MC/EC  xxxxxxxxx  HMO    









 Guarantor Name  Account Type  Relation to  Date of  Phone  Billing



     Patient  Birth    Address

 

 Edna Rainey  Personal/Family  Self  1965  [phone]  [address] [place] [address]







         (Home)



  [place], [place]



         [phone]



         (Work)  







Advance Directives

Patient has advance care planning documents on file. For more information, 
please contact:Manpreet Hill[address] [place] [address]

## 2018-12-10 NOTE — EDPHYS
Physician Documentation                                                                           

 [facility]                                                                

Name: Edna Rainey                                                                             

Age: 53 yrs                                                                                       

Sex: Female                                                                                       

: 1965                                                                                   

MRN: [id number]                                                                                   

Arrival Date: 12/10/2018                                                                          

Time: 14:11                                                                                       

Account#: [id number]                                                                            

Bed 5                                                                                             

Private MD: Kael Sarah                                                                         

ED Physician Fitz Fitch                                                                         

HPI:                                                                                              

12/10                                                                                             

16:07 This 53 yrs old  Female presents to ER via Ambulatory with complaints of       rn  

      Altered Mental Status.                                                                      

16:08 The patient presents with confusion, decreased mental status, decreased responsiveness, rn  

      disorientation. Onset: The symptoms/episode began/occurred 5 day(s) ago. Possible           

      causes: unknown. Current symptoms: In the emergency department the patient's symptoms       

      have improved. The patient has experienced similar episodes in the past. Daughter           

      reports for about 5 days now with intermittent confusion, disorientation, acting funny,     

      is daily drinker, last drink yesterday, no known head injury, no focal neurological         

      complaint. Pt does not recall simple facts lately, including her birthday..                 

                                                                                                  

OB/GYN:                                                                                           

14:37 LMP N/A - Post-menopause                                                                hj  

                                                                                                  

Historical:                                                                                       

- Allergies:                                                                                      

14:36 No Known Allergies;                                                                     hj  

- Home Meds:                                                                                      

14:36 hydrocodone-acetaminophen  mg oral tab 1 tab every 6 hours [Active];              hj  

      Tylenol-Codeine #3 300-30 mg oral tab 1 tab every 4-6 hours [Active]; cyclobenzaprine       

      10 mg Oral tab 1 tab daily [Active]; estradiol 4 mg Oral tab 1 tab once daily [Active];     

      losartan-hydrochlorothiazide 100-12.5 mg Oral tab 1 tab once daily [Active];                

- PMHx:                                                                                           

14:36 abscess in the GI tract; Arthritis; chron's disease; Colitis; Degenerative disc         hj  

      disease; Diverticulitis;                                                                    

- PSHx:                                                                                           

14:36 cyst;                                                                                   hj  

                                                                                                  

- Immunization history:: Adult Immunizations up to date.                                          

- Social history:: Smoking status: Patient uses tobacco products, Patient uses alcohol.           

- Ebola Screening: : Patient negative for fever greater than or equal to 101.5 degrees            

  Fahrenheit, and additional compatible Ebola Virus Disease symptoms Patient denies               

  exposure to infectious person Patient denies travel to an Ebola-affected area in the            

  21 days before illness onset.                                                                   

- Family history:: not pertinent.                                                                 

- Hospitalizations: : No recent hospitalization is reported.                                      

                                                                                                  

                                                                                                  

ROS:                                                                                              

16:08 Constitutional: Negative for fever, chills, and weight loss, Eyes: Negative for injury, rn  

      pain, redness, and discharge, Neck: Negative for injury, pain, and swelling,                

      Cardiovascular: Negative for chest pain, palpitations, and edema, Respiratory: Negative     

      for shortness of breath, cough, wheezing, and pleuritic chest pain, Abdomen/GI:             

      Negative for abdominal pain, nausea, vomiting, diarrhea, and constipation, Back:            

      Negative for injury and pain, MS/Extremity: Negative for injury and deformity, Skin:        

      Negative for injury, rash, and discoloration, Neuro: Negative for headache, numbness,       

      tingling, and seizure.                                                                      

                                                                                                  

Exam:                                                                                             

16:08 Constitutional:  This is a well developed, well nourished patient who is awake, alert,  rn  

      and in no acute distress. Head/Face:  Normocephalic, atraumatic. Eyes:  Pupils equal        

      round and reactive to light, extra-ocular motions intact. + bilateral nystagmus ENT:        

      Dry MM with lip twitching Cardiovascular:  Regular rate and rhythm with a normal S1 and     

      S2.  No gallops, murmurs, or rubs.  No pulse deficits. Respiratory:  Lungs have equal       

      breath sounds bilaterally, clear to auscultation Abdomen/GI:  soft, non-tender MS/          

      Extremity:  Pulses equal, no cyanosis.  Neurovascular intact.  Full, normal range of        

      motion.  Equal circumference. Neuro:  Awake and alert, GCS 15, oriented to person,place     

      and time, cannot recall her birthday.  Cranial nerves II-XII grossly intact.  Motor         

      strength 5/5 in all extremities.  Sensory grossly intact.  Unsteady gait, partially         

      limited due to foot injury                                                                  

                                                                                                  

Vital Signs:                                                                                      

14:37  / 123; Pulse 87; Resp 18; Temp 98.2(TE); Pulse Ox 100% on R/A; Weight 69.4 kg;   hj  

      Height 5 ft. 4 in. (162.56 cm); Pain 0/10;                                                  

15:45  / 89; Pulse 81; Resp 15; Pulse Ox 100% on R/A;                                   sv  

16:00  / 98; Pulse 83; Resp 15; Pulse Ox 100% ;                                         sv  

16:36  / 92; Pulse 91; Resp 16; Pulse Ox 99% ;                                          sv  

17:21  / 73; Pulse 89; Resp 15; Pulse Ox 91% on R/A;                                    sv  

17:56  / 87; Pulse 91; Resp 20; Pulse Ox 100% on 2 lpm NC;                              sv  

18:34  / 89; Pulse 94; Resp 15; Pulse Ox 99% on 2 lpm NC;                               sv  

19:03  / 89; Pulse 100; Resp 18; Pulse Ox 98% ;                                         ea  

14:37 Body Mass Index 26.26 (69.40 kg, 162.56 cm)                                             hj  

17:21 Pt placed on O2 \T\ 2L per NC. O2 sat up to 95%.                                          sv  

                                                                                                  

MDM:                                                                                              

14:45 Patient medically screened.                                                             rn  

17:47 Differential Diagnosis: CVA, electrolyte abnormality, hypoglycemia, overdose,           rn  

      pneumonia, UTI, volume depletion, metabolic disorder, wernicke's. Data reviewed: vital      

      signs, nurses notes, lab test result(s), radiologic studies, CT scan, and as a result,      

      I will admit patient. Counseling: I had a detailed discussion with the patient and/or       

      guardian regarding: the historical points, exam findings, and any diagnostic results        

      supporting the discharge/admit diagnosis, the presence of at least one elevated blood       

      pressure reading (>120/80) during this emergency department visit, lab results,             

      radiology results, the need for further work-up and treatment in the hospital.              

      Admission orders: after a detailed discussion of the patient's condition and case, the      

      admit orders are written by me. ED course: Pt with mild improvement, clinically stable,     

      improved with valium and banana bag, given ataxia/nystagmus/memory issues and delirium,     

      possible wernicke's with mild ETOH withdrawal and metabolic disorder, admitted to Dr. Masters for further care. .                                                                   

                                                                                                  

12/10                                                                                             

15:00 Order name: Troponin (emerg Dept Use Only); Complete Time: 16:50                        rn  

12/10                                                                                             

15:00 Order name: Lipase; Complete Time: 16:50                                                rn  

12/10                                                                                             

15:00 Order name: Basic Metabolic Panel; Complete Time: 16:50                                 rn  

12/10                                                                                             

15:00 Order name: CBC with Diff; Complete Time: 16:31                                         rn  

12/10                                                                                             

15:00 Order name: CPK; Complete Time: 16:50                                                   rn  

12/10                                                                                             

15:00 Order name: Hepatic Function; Complete Time: 16:50                                      rn  

12/10                                                                                             

15:00 Order name: Magnesium; Complete Time: 16:50                                             rn  

12/10                                                                                             

15:00 Order name: Urine Drug Screen                                                           rn  

12/10                                                                                             

15:00 Order name: AMMONIA; Complete Time: 16:31                                               rn  

10                                                                                             

15:00 Order name: Urine Microscopic Only                                                      rn  

12/10                                                                                             

15:00 Order name: Urine Culture                                                               rn  

12/10                                                                                             

17:18 Order name: Urine Dipstick--Ancillary (enter results)                                   eb  

12/10                                                                                             

17:18 Order name: Urine Pregnancy--Ancillary (enter results)                                  eb  

12/10                                                                                             

17:53 Order name: CBC with Automated Diff                                                     EDMS

12/10                                                                                             

15:00 Order name: CT Head Brain wo Cont; Complete Time: 15:58                                 rn  

12/10                                                                                             

17:53 Order name: CBC with Automated Diff                                                     EDMS

12/10                                                                                             

17:53 Order name: CBC with Automated Diff                                                     EDMS

12/10                                                                                             

17:53 Order name: CBC with Automated Diff                                                     EDMS

12/10                                                                                             

17:53 Order name: Comprehensive Metabolic Panel                                               EDMS

12/10                                                                                             

17:53 Order name: Comprehensive Metabolic Panel                                               EDMS

12/10                                                                                             

17:53 Order name: Comprehensive Metabolic Panel                                               EDMS

12/10                                                                                             

17:53 Order name: Comprehensive Metabolic Panel                                               EDMS

12/10                                                                                             

17:53 Order name: Magnesium                                                                   EDMS

12/10                                                                                             

17:53 Order name: Magnesium                                                                   EDMS

12/10                                                                                             

17:53 Order name: Magnesium                                                                   EDMS

12/10                                                                                             

17:53 Order name: Magnesium                                                                   EDMS

12/10                                                                                             

17:54 Order name: Phosphorus                                                                  EDMS

12/10                                                                                             

17:54 Order name: Phosphorus                                                                  EDMS

12/10                                                                                             

17:54 Order name: Phosphorus                                                                  EDMS

12/10                                                                                             

17:54 Order name: Phosphorus                                                                  EDMS

12/10                                                                                             

15:00 Order name: EKG; Complete Time: 15:02                                                   rn  

12/10                                                                                             

15:00 Order name: Cardiac monitoring; Complete Time: 16:08                                    rn  

10                                                                                             

15:00 Order name: EKG - Nurse/Tech; Complete Time: 16:18                                      rn  

10                                                                                             

15:00 Order name: IV Saline Lock; Complete Time: 16:08                                        rn  

12/10                                                                                             

15:00 Order name: Labs collected and sent; Complete Time: 16:08                               rn  

1210                                                                                             

15:00 Order name: NPO; Complete Time: 16:08                                                   rn  

10                                                                                             

15:00 Order name: O2 Per Protocol; Complete Time: 16:08                                       rn  

12/10                                                                                             

15:00 Order name: O2 Sat Monitoring; Complete Time: 16:08                                     rn  

12/10                                                                                             

15:00 Order name: Urine Dipstick-Ancillary (obtain specimen); Complete Time: 18:07            rn  

12/10                                                                                             

15:00 Order name: XRAY Chest (1 view); Complete Time: 16:06                                   rn  

12/10                                                                                             

17:53 Order name: Heart Healthy                                                               EDMS

                                                                                                  

Administered Medications:                                                                         

16:07 Drug: NS 0.9% 1000 ml Route: IV; Rate: 1000 ml; Site: left hand;                        sv  

17:55 Follow up: Response: No adverse reaction; IV Status: Completed infusion; IV Intake:     sv  

      1000ml                                                                                      

16:36 Drug: Banana Bag - (NS 0.9% 1000 ml, foLIC Acid 1 mg, Thiamine 100 mg, Multivitamin 1   sv  

      amp) Route: IV; Rate: calculated rate; Site: left hand;                                     

17:15 Drug: Valium 10 mg Route: IVP; Site: left hand;                                         sv  

17:30 Follow up: Response: No adverse reaction                                                sv  

18:45 Not Given (not to give per Dr Fitch): cloNIDine 0.2 mg PO once                          sv  

                                                                                                  

                                                                                                  

Disposition:                                                                                      

12/10/18 17:50 Hospitalization ordered by Manuela Masters for Inpatient Admission. Preliminary     

  diagnosis are Delirium due to known physiological condition, Wernicke's                         

  encephalopathy.                                                                                 

- Bed requested for Telemetry/MedSurg (Inpatient).                                                

- Status is Inpatient Admission.                                                              ak1 

- Condition is Stable.                                                                            

- Problem is new.                                                                                 

- Symptoms have improved.                                                                         

UTI on Admission? No                                                                              

                                                                                                  

                                                                                                  

                                                                                                  

Signatures:                                                                                       

Dispatcher [employer]                           [facility]                                                 

Rox Betancourt RN RN sv Woody, Diana, RN                        Fitz Chavarria MD MD rn Krenek, Amber, RN RN   ak1                                                  

Ja Enriquez RN RN                                                      

                                                                                                  

Corrections: (The following items were deleted from the chart)                                    

17:45 16:08 Constitutional: This is a well developed, well nourished patient who is awake,    rn  

      alert, and in no acute distress. Head/Face: Normocephalic, atraumatic. Eyes: Pupils         

      equal round and reactive to light, extra-ocular motions intact. ENT: Dry MM with lip        

      twitching Cardiovascular: Regular rate and rhythm with a normal S1 and S2. No gallops,      

      murmurs, or rubs. No pulse deficits. Respiratory: Lungs have equal breath sounds            

      bilaterally, clear to auscultation Abdomen/GI: soft, non-tender MS/ Extremity: Pulses       

      equal, no cyanosis. Neurovascular intact. Full, normal range of motion. Equal               

      circumference. Neuro: Awake and alert, GCS 15, oriented to person,place and time,           

      cannot recall her birthday. Cranial nerves II-XII grossly intact. Motor strength 5/5 in     

      all extremities. Sensory grossly intact. rn                                                 

18:32 17:50 Hospitalization Ordered by Manuela Masters MD for Inpatient Admission. Preliminary  dw  

      diagnosis is Delirium due to known physiological condition; Wernicke's encephalopathy.      

      Bed requested for Telemetry/MedSurg (Inpatient). Status is Inpatient Admission.             

      Condition is Stable. Problem is new. Symptoms have improved. UTI on Admission? No. rn       

20:05 18:32 12/10/2018 17:50 Hospitalization Ordered by Manuela Masters MD for Inpatient        ak1 

      Admission. Preliminary diagnosis is Delirium due to known physiological condition;          

      Wernicke's encephalopathy. Bed requested for Telemetry/MedSurg (Inpatient). Status is       

      Inpatient Admission. Condition is Stable. Problem is new. Symptoms have improved. UTI       

      on Admission? No. dw                                                                        

                                                                                                  

**************************************************************************************************

## 2018-12-10 NOTE — RAD REPORT
EXAM DESCRIPTION:  RAD - Chest Single View - 12/10/2018 3:30 pm

 

CLINICAL HISTORY:  Shortness of breath

 

COMPARISON:  July 2018

 

TECHNIQUE:  AP portable chest image was obtained 1523 hours .

 

FINDINGS:  No focal lung parenchymal process. No failure or volume overload. Interstitial markings ar
e stable from comparison. Heart and vasculature are normal. No measurable pleural effusion and no pne
umothorax. No acute bony abnormality seen. No acute aortic findings suspected.

 

IMPRESSION:  No acute cardiopulmonary process.

 

No significant interval change.

## 2018-12-10 NOTE — RAD REPORT
EXAM DESCRIPTION:  CT - Head Brain Wo Cont - 12/10/2018 3:15 pm

 

CLINICAL HISTORY:  Transient alteration of awareness

 

COMPARISON:  None.

 

TECHNIQUE:  Axial 5 mm thick images of the head were obtained without IV contrast.

 

All CT scans are performed using dose optimization technique as appropriate and may include automated
 exposure control or mA/KV adjustment according to patient size.

 

FINDINGS:  No intracranial hemorrhage, mass, edema or shift of mid-line structures. No acute infarcti
on changes seen. Minimal volume loss evident. No significant chronic ischemic change. Ventricles are 
normal.

 

Mastoid air cells and visualized portions of the paranasal sinuses are clear.

 

No acute bony findings.

 

 

IMPRESSION:  Negative non-contrast CT head examination for acute finding.

## 2018-12-10 NOTE — P.HP
Certification for Inpatient


Patient admitted to: Observation


With expected LOS: <2 Midnights


Practitioner: I am a practitioner with admitting privileges, knowledge of 

patient current condition, hospital course, and medical plan of care.


Services: Services provided to patient in accordance with Admission 

requirements found in Title 42 Section 412.3 of the Code of Federal Regulations





Patient History


Date of Service: 12/10/18


Reason for admission: acute encephalopathy


History of Present Illness: 





Ms Rainey is a 53 years old woman with history of HTN, Alcohol and tobacco 

abuse, she states that usually drinks 6 or more beers per day, start about 5 

days ago with episodes of confusion. Her sister is at bedside and helping the 

patient to provide the history. There are no history of fever, chills, nausea, 

vomiting, diarrhea or abdominal pain. She also denied any weakness, tingling or 

numbness. In ER she has been treated with benzodiazepines, IV fluids and banana 

bag. At my encounter the patient was oriented x 3, able to answer my coherently 

my questions. Lab work remarkable for normal WBC count, hyponatremia (not new), 

elevated lipase. CT head shows no acute abnormalities.


Allergies





No Known Allergies Allergy (Verified 10/19/15 07:43)


 





Home medications list reviewed: Yes


Home Medications: 








Escitalopram [Lexapro*] 0.5 tab PO DAILY 03/07/14 


Estradiol 1 tab PO DAILY 11/17/16 


Iron Fum/Folic Acid/Mv,Min 15 [Hemocyte Plus Capsule] 1 tab PO DAILY 11/17/16 


Losartan/Hydrochlorothiazide [Losartan-Hctz 100-12.5 mg Tab] 1 tab PO DAILY 11/ 17/16 


Mesalamine [Lialda] 2 tab PO DAILY 11/17/16 


Omeprazole [Prilosec] 1 tab PO DAILY 11/17/16 


Progesterone,Micronized [Progesterone] 1 tab PO DAILY 11/17/16 


levoFLOXacin [Levaquin] 500 mg PO DAILY #14 tab 11/18/16 


metroNIDAZOLE [Flagyl*] 500 mg PO Q8H #42 tablet 11/18/16 








- Past Medical/Surgical History


Diabetic: No


-: HTN


-: Menopausal symptoms


-: Tobacco abuse


-: intestinal abscess-removed hemicolectomy.


-: colitis/diverticulitis


-: alcohol abuse


-: Tubal ligation


-: Ganglion cyst removal time s2 on the left


-: Foot surgery


-: Chest tube placement after falling off the horse


Psychosocial/ Personal History: , Dogs-2 and one guinea pig. Work-Lab 

tech





- Family History


  ** Father


-: Hypertension





  ** Mother


-: Hypertension





- Social History


Smoking Status: Current every day smoker


Counseled patient to stop smoking for: less than 10 minutes


Alcohol use: Yes


CD- Drugs: No


Caffeine use: Yes


Place of Residence: Home





Review of Systems


10-point ROS is otherwise unremarkable





Physical Examination





- Vital Signs


Temperature: 98.2 F


Blood Pressure: 169/89


Pulse: 100


Respirations: 18





- Physical Exam


General: Alert, In no apparent distress, Oriented x3


HEENT: Atraumatic, PERRLA, Mucous membr. moist/pink, EOMI, Sclerae nonicteric


Neck: Supple, 2+ carotid pulse no bruit, No LAD, Without JVD or thyroid 

abnormality


Respiratory: Clear to auscultation bilaterally, Normal air movement


Cardiovascular: Regular rate/rhythm, Normal S1 S2


Gastrointestinal: Normal bowel sounds, No tenderness


Musculoskeletal: No tenderness


Integumentary: No rashes


Neurological: Normal speech, Normal strength at 5/5 x4 extr, Normal tone, 

Normal affect


Lymphatics: No axilla or inguinal lymphadenopathy





- Studies


Laboratory Data (last 24 hrs)





12/10/18 15:50: WBC 5.8, Hgb 12.0, Hct 33.6 L, Plt Count 444 H


12/10/18 15:50: Sodium 128 L, Potassium 3.3 L, BUN 10, Creatinine 0.80, Glucose 

85, Magnesium 1.5 L, Total Bilirubin 0.4, AST 18, ALT 21, Alkaline Phosphatase 

82, Lipase 403 H








Assessment and Plan





- Problems (Diagnosis)


(1) Acute encephalopathy


Current Visit: Yes   Status: Acute   





(2) Alcohol abuse


Current Visit: No   Status: Acute   





(3) Hypokalemia


Onset Date: 11/18/16   Current Visit: No   Status: Acute   





(4) Hyponatremia


Onset Date: 11/18/16   Current Visit: No   Status: Acute   





- Plan





The patient will be admitted to the hospital due to acute encephalopathy. So 

far no clear etiology found, CT head shows no acute abnormalities. Lipase is 

elevated, so pancreatitis might be withing the differentials as trigger for her 

AMS. Will order abd US, also brain MRI to R/O acute CVA.





- Advance Directives


Does patient have a Living Will: No


Does patient have a Durable POA for Healthcare: No





- Code Status/Comfort Care


Code Status Assessed: Yes


Code Status: Full Code

## 2018-12-11 VITALS — OXYGEN SATURATION: 100 %

## 2018-12-11 LAB
ALBUMIN SERPL BCP-MCNC: 3 G/DL (ref 3.4–5)
ALP SERPL-CCNC: 64 U/L (ref 45–117)
ALT SERPL W P-5'-P-CCNC: 17 U/L (ref 12–78)
AST SERPL W P-5'-P-CCNC: 11 U/L (ref 15–37)
BUN BLD-MCNC: 13 MG/DL (ref 7–18)
GLUCOSE SERPLBLD-MCNC: 96 MG/DL (ref 74–106)
HCT VFR BLD CALC: 30.5 % (ref 36–45)
LIPASE SERPL-CCNC: 645 U/L (ref 73–393)
LYMPHOCYTES # SPEC AUTO: 1.9 K/UL (ref 0.7–4.9)
MAGNESIUM SERPL-MCNC: 1.6 MG/DL (ref 1.8–2.4)
MCH RBC QN AUTO: 34.5 PG (ref 27–35)
MCV RBC: 98.5 FL (ref 80–100)
PMV BLD: 7.2 FL (ref 7.6–11.3)
POTASSIUM SERPL-SCNC: 3.3 MMOL/L (ref 3.5–5.1)
RBC # BLD: 3.09 M/UL (ref 3.86–4.86)

## 2018-12-11 RX ADMIN — SODIUM CHLORIDE SCH MLS: 9 INJECTION, SOLUTION INTRAVENOUS at 08:30

## 2018-12-11 RX ADMIN — CEFTRIAXONE SCH MLS: 1 INJECTION, SOLUTION INTRAVENOUS at 17:50

## 2018-12-11 RX ADMIN — Medication SCH ML: at 20:37

## 2018-12-11 RX ADMIN — NICOTINE SCH MG: 21 PATCH TRANSDERMAL at 08:29

## 2018-12-11 RX ADMIN — POTASSIUM CHLORIDE ONE MEQ: 10 TABLET, FILM COATED, EXTENDED RELEASE ORAL at 13:48

## 2018-12-11 RX ADMIN — LOSARTAN POTASSIUM SCH MG: 50 TABLET, FILM COATED ORAL at 16:30

## 2018-12-11 RX ADMIN — LOSARTAN POTASSIUM SCH MG: 50 TABLET, FILM COATED ORAL at 13:44

## 2018-12-11 RX ADMIN — POTASSIUM CHLORIDE ONE: 10 TABLET, FILM COATED, EXTENDED RELEASE ORAL at 09:00

## 2018-12-11 RX ADMIN — POTASSIUM CHLORIDE ONE MEQ: 10 TABLET, FILM COATED, EXTENDED RELEASE ORAL at 16:31

## 2018-12-11 RX ADMIN — SODIUM CHLORIDE SCH: 0.9 INJECTION, SOLUTION INTRAVENOUS at 18:00

## 2018-12-11 RX ADMIN — Medication SCH ML: at 13:45

## 2018-12-11 RX ADMIN — Medication SCH: at 20:37

## 2018-12-11 RX ADMIN — SODIUM CHLORIDE SCH: 0.9 INJECTION, SOLUTION INTRAVENOUS at 08:00

## 2018-12-11 NOTE — RAD REPORT
EXAM DESCRIPTION:  MRI - Brain W/Wo Cont - 12/11/2018 11:10 am

 

CLINICAL HISTORY:  Acute encephalopathy

 

COMPARISON:  CT head December 10

 

TECHNIQUE:  Sagittal and axial T1-weighted images were obtained. Axial PD/heavily T2-weighted and T2-
FLAIR images were obtained along with axial DWI/ADC mapping sequences.  Coronal heavily T2 weighted s
equence obtained.  Axial and coronal post-contrast T1-weighted images were also obtained. A 15 ml Mul
tihance contrast following utilized.

 

FINDINGS:  No intracranial hemorrhage, mass or acute infarction.  There is no edema or shift of midli
ne structures. No extra-axial fluid collections. Gray-matter/white matter junction is preserved.  Sig
nal voids are seen as a normal finding in the major intracranial vessels. Patient does have some volu
me loss change greater than typically seen in a patient this age. Chronic ischemic changes are eviden
t in the brainstem. No significant chronic ischemic changes seen in the cerebral hemispheres. Ventric
les are normal for the amount of volume loss.

 

Post-contrast images show normal enhancement. No dural thickening.

 

Mastoid air cells and paranasal sinuses are clear.

 

No globe or orbital content acute finding. No tonsillar ectopia. No sella or supra sella abnormality.


 

IMPRESSION:  No infarction, mass, edema or other acute intracranial finding.

 

Patient does have evidence for volume loss greater than typically seen at this age. Brainstem chronic
 ischemic changes are evident. No significant chronic ischemic changes in the cerebral hemispheres.

## 2018-12-11 NOTE — RAD REPORT
EXAM DESCRIPTION:  US - Abdomen Exam Complete - 12/11/2018 11:30 am

 

CLINICAL HISTORY:

Abnormal liver function studies

 

COMPARISON:  CT imaging November 17, 2016

 

FINDINGS:  Gallbladder is partially contracted. No stones or sludge identifiable. Wall thickening is 
accentuated by the partially contracted state. No true wall thickening or mass seen. No pericholecyst
ic fluid.

 

 Common bile duct is normal with no common duct stone identified. Liver is 14.5 cm in maximum dimensi
on. No capsular nodularity or focal liver lesion. There is a slightly coarsened and increased parench
ymal echogenicity that is nonspecific but commonly seen with fatty infiltration. Spleen is 9 cm with 
no focal abnormality. The pancreas is normal.

 

No hydronephrosis or suspicious mass in either kidney

 

Aorta and IVC show no suspicious findings. No ascites or bulky lymphadenopathy.

 

IMPRESSION:  No gallbladder or biliary tree abnormality.

 

Mild fatty infiltration of a normal size liver. No focal liver lesion.

## 2018-12-11 NOTE — P.PN
Subjective


Date of Service: 12/11/18


Primary Care Provider: KADEN machado


Chief Complaint: acute encephalopathy


Subjective: Improving





Physical Examination





- Vital Signs


Temperature: 97.6 F


Blood Pressure: 142/94


Pulse: 87


Respirations: 18


Pulse Ox (%): 97





- Physical Exam


General: Alert, In no apparent distress, Cooperative, Other (Minimal confusion 

noted today.  Patient alert an oriented x2.  Patient admits to alcohol use.)


HEENT: Atraumatic


Neck: Supple


Respiratory: Clear to auscultation bilaterally, Normal air movement


Cardiovascular: Normal pulses, Regular rate/rhythm


Gastrointestinal: Normal bowel sounds, Soft and benign, Non-distended


Integumentary: No tenderness/swelling, No erythema, No warmth, No cyanosis


Neurological: Normal speech, Normal strength at 5/5 x4 extr, Normal tone, 

Normal affect





- Studies


Medications List Reviewed: Yes





Assessment & Plan


Discharge Plan: Home


Plan to discharge in: 24 Hours


Physician Review Additional Text: 





Impression:


Toxic encephalopathy likely related to UTI


Alcohol use and abuse


Hypertension


Hyponatremia


Hypokalemia


Fatty liver





Plan:


IV Rocephin started for UTI.  Await urine culture results.  MRI brain 

unremarkable for stroke.  Will continue IV fluids.  What physical therapy 

assess ambulation.  Anticipate discharge within the next 24 hr.  Patient will 

require home health and physical therapy at discharge. Alcohol cessation 

addressed in detail.  Hyponatremia and hypokalemia improved.  Fatty liver 

addressed.  This can be further evaluated as an outpatient.  Case discussed 

with her sister.  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  Will 

continue with vitamin B12.  Continue with PPI.  Will reassess and plan for 

discharge tomorrow.  Will continue with blood pressure medication.  Will 

discontinue hydrochlorothiazide due to hypokalemia.


Time Spent Managing Pts Care (In Minutes): 55

## 2018-12-11 NOTE — EKG
Test Date:    2018-12-10               Test Time:    15:22:35

Technician:   DT/V                                   

                                                     

MEASUREMENT RESULTS:                                       

Intervals:                                           

Rate:         83                                     

OK:           130                                    

QRSD:         72                                     

QT:           364                                    

QTc:          427                                    

Axis:                                                

P:            83                                     

OK:           130                                    

QRS:          51                                     

T:            64                                     

                                                     

INTERPRETIVE STATEMENTS:                                       

                                                     

Normal sinus rhythm

Normal ECG

Compared to ECG 03/07/2014 12:36:26

No significant changes



Electronically Signed On 12-11-18 05:47:14 CST by Guillermo Gauthier

## 2018-12-12 VITALS — TEMPERATURE: 98.3 F

## 2018-12-12 VITALS — DIASTOLIC BLOOD PRESSURE: 100 MMHG | SYSTOLIC BLOOD PRESSURE: 160 MMHG

## 2018-12-12 LAB
ALBUMIN SERPL BCP-MCNC: 2.8 G/DL (ref 3.4–5)
ALP SERPL-CCNC: 62 U/L (ref 45–117)
ALT SERPL W P-5'-P-CCNC: 15 U/L (ref 12–78)
AST SERPL W P-5'-P-CCNC: 10 U/L (ref 15–37)
BUN BLD-MCNC: 11 MG/DL (ref 7–18)
GLUCOSE SERPLBLD-MCNC: 106 MG/DL (ref 74–106)
HCT VFR BLD CALC: 28.8 % (ref 36–45)
LYMPHOCYTES # SPEC AUTO: 1.3 K/UL (ref 0.7–4.9)
MAGNESIUM SERPL-MCNC: 1.6 MG/DL (ref 1.8–2.4)
MCH RBC QN AUTO: 34.6 PG (ref 27–35)
MCV RBC: 99.6 FL (ref 80–100)
PMV BLD: 7.3 FL (ref 7.6–11.3)
POTASSIUM SERPL-SCNC: 3.2 MMOL/L (ref 3.5–5.1)
RBC # BLD: 2.89 M/UL (ref 3.86–4.86)

## 2018-12-12 RX ADMIN — NICOTINE SCH MG: 21 PATCH TRANSDERMAL at 09:49

## 2018-12-12 RX ADMIN — CEFTRIAXONE SCH MLS: 1 INJECTION, SOLUTION INTRAVENOUS at 10:04

## 2018-12-12 RX ADMIN — SODIUM CHLORIDE SCH MLS: 9 INJECTION, SOLUTION INTRAVENOUS at 09:49

## 2018-12-12 RX ADMIN — SODIUM CHLORIDE SCH MLS: 0.9 INJECTION, SOLUTION INTRAVENOUS at 00:09

## 2018-12-12 RX ADMIN — LOSARTAN POTASSIUM SCH MG: 50 TABLET, FILM COATED ORAL at 09:48

## 2018-12-12 RX ADMIN — Medication SCH ML: at 09:52

## 2018-12-12 RX ADMIN — SODIUM CHLORIDE SCH: 0.9 INJECTION, SOLUTION INTRAVENOUS at 04:00

## 2018-12-12 RX ADMIN — Medication SCH: at 09:00

## 2018-12-12 NOTE — P.DS
Admission Date: 12/10/18


Discharge Date: 12/12/18


Primary Care Provider: KADEN machado


Disposition: DC HOME/HOME HEALTH CARE


Discharge Condition: GOOD


Reason for Admission: acute encephalopathy


Consultations: 





none





Procedures: 





MRI Brain: 


COMPARISON:  CT head December 10 


TECHNIQUE:  Sagittal and axial T1-weighted images were obtained. Axial PD/

heavily T2-weighted and T2-FLAIR images were obtained along with axial DWI/ADC 

mapping sequences.  Coronal heavily T2 weighted sequence obtained.  Axial and 

coronal post-contrast T1-weighted images were also obtained. A 15 ml Multihance 

contrast following utilized. 


FINDINGS:  No intracranial hemorrhage, mass or acute infarction.  There is no 

edema or shift of midline structures. No extra-axial fluid collections. Gray-

matter/white matter junction is preserved.  Signal voids are seen as a normal 

finding in the major intracranial vessels. Patient does have some volume loss 

change greater than typically seen in a patient this age. Chronic ischemic 

changes are evident in the brainstem. No significant chronic ischemic changes 

seen in the cerebral hemispheres. Ventricles are normal for the amount of 

volume loss. 


Post-contrast images show normal enhancement. No dural thickening. 


Mastoid air cells and paranasal sinuses are clear. 


No globe or orbital content acute finding. No tonsillar ectopia. No sella or 

supra sella abnormality. 


IMPRESSION:  No infarction, mass, edema or other acute intracranial finding. 


Patient does have evidence for volume loss greater than typically seen at this 

age. Brainstem chronic ischemic changes are evident. No significant chronic 

ischemic changes in the cerebral hemispheres.





CT brain: 


COMPARISON:  None. 


TECHNIQUE:  Axial 5 mm thick images of the head were obtained without IV 

contrast. 


All CT scans are performed using dose optimization technique as appropriate and 

may include automated exposure control or mA/KV adjustment according to patient 

size. 


FINDINGS:  No intracranial hemorrhage, mass, edema or shift of mid-line 

structures. No acute infarction changes seen. Minimal volume loss evident. No 

significant chronic ischemic change. Ventricles are normal. 


Mastoid air cells and visualized portions of the paranasal sinuses are clear. 


No acute bony findings. 


IMPRESSION:  Negative non-contrast CT head examination for acute finding.





ABUS: 


COMPARISON:  CT imaging November 17, 2016 


FINDINGS:  Gallbladder is partially contracted. No stones or sludge 

identifiable. Wall thickening is accentuated by the partially contracted state. 

No true wall thickening or mass seen. No pericholecystic fluid. 


 Common bile duct is normal with no common duct stone identified. Liver is 14.5 

cm in maximum dimension. No capsular nodularity or focal liver lesion. There is 

a slightly coarsened and increased parenchymal echogenicity that is nonspecific 

but commonly seen with fatty infiltration. Spleen is 9 cm with no focal 

abnormality. The pancreas is normal. 


No hydronephrosis or suspicious mass in either kidney 


Aorta and IVC show no suspicious findings. No ascites or bulky lymphadenopathy. 


IMPRESSION:  No gallbladder or biliary tree abnormality. 


Mild fatty infiltration of a normal size liver. No focal liver lesion.





Medical Problem list: 


Toxic encephalopathy likely related to alcohol abuse and mild malnutrition with 

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


Alcohol use and abuse


Hypertension


Tobacco abuse


COPD


GERD


Hyponatremia


Hypokalemia


Depression


Fatty liver


History of Colitis





Brief History of Present Illness: 





53-year-old  female presented emergency room with slight confusion.  

Patient admitted to alcohol use.  Patient was given IV banana bag.  

Electrolytes were abnormal.  Patient with hyponatremia and hypokalemia.  

Patient admitted for further evaluation.


Hospital Course: 





Patient presented with confusion.  Patient with history of alcohol abuse and 

malnutrition.  Patient also with hyponatremia and hypokalemia.  Patient 

admitted for IV fluid hydration, electrolyte correction, and further 

evaluation.  Stroke protocol MRI unremarkable.  CT of the head also 

unremarkable.  Patient initially treated for UTI but this was ruled out.  

Patient improved with IV fluid hydration.  Patient at risk for TIA and stroke 

in the future due to her history of hypertension and alcohol abuse.  Physical 

therapy has worked with the patient.  Fall precautions in place.  Patient back 

to her baseline mentation. At discharge she will continue with thiamine 100 mg 

daily and folic acid 1 mg daily for her alcohol abuse and malnutrition.  

Patient will also continue with aspirin 81 mg daily due to risk of TIA and 

stroke.  At discharge sister plans to take the patient to alcohol rehab center 

in [facility] if patient agrees.  Patient encouraged to discontinue alcohol.  

Patient is willing but reports that she will likely continue with alcohol.  Her 

sister will continue to help support the patient.  Recommendation is for the 

patient to have a fasting lipid panel checked in 4-6 weeks to consider statin 

medication.  Patient will be provided a limited supply of Xanax 0.25 mg 1 pill 

twice daily as needed for anxiety.  Patient should follow up with her PCP in 1 

week to follow up this hospitalization.  Recommendation to recheck lab-BMP in 1 

week.





Patient has hypertension.  Medications adjusted due to hyponatremia, 

hypokalemia and elevated blood pressure.  Hydrochlorothiazide discontinued.  

Carvedilol was added.  At discharge she will continue with losartan 100 mg 

daily and carvedilol 12.5 mg twice daily.  Recommendation is to maintain blood 

pressures less 150/80.  Further adjustment can be done by her PCP.  

Recommendation to recheck lab-BMP within 1 week to monitor her progress.





Patient also found to have fatty liver.  Lipase levels were slightly elevated 

but patient without any symptoms.  Patient with history of colitis.  She takes 

Lialda.  Patient may continue with her medication.  Education on fatty liver 

will be provided.  Patient may follow up with GI as an outpatient to further 

monitor.  Alcohol cessation recommended.





Patient with depression and anxiety.  She will continue with her medication-

Lexapro 10 mg daily.  A limited supply of Xanax 0.25 mg 1 pill twice daily as 

needed for anxiety will be provided.  Patient may benefit with psychiatric 

evaluation as an outpatient.





Alcohol cessation addressed in detail.  Fall precautions in place.





Patient likely has GERD.  Patient will continue with Prilosec 40 mg daily.  

Recommendation to follow up with GI as an outpatient to further evaluate.





Patient with history of COPD and tobacco abuse.  Tobacco cessation addressed in 

detail.  Patient may continue with her medication of Symbicort 2 puffs twice 

daily and albuterol 2 puffs 3 times a day as needed for shortness of breath.  

Patient may follow up with pulmonology as an outpatient to further monitor.





At discharge Flexeril and ibuprofen will be discontinued. 





Patient also sees gynecology and takes hormone medication.  She will continue 

with this.


Vital Signs/Physical Exam: 














Temp Pulse Resp BP Pulse Ox


 


 98.4 F   94 H  18   195/98 H  98 


 


 12/12/18 08:00  12/12/18 08:00  12/12/18 08:00  12/12/18 08:00  12/12/18 08:00








General: Alert, In no apparent distress, Oriented x3, Cooperative


HEENT: Atraumatic


Neck: Supple


Respiratory: Clear to auscultation bilaterally, Normal air movement


Cardiovascular: Normal pulses, Regular rate/rhythm


Gastrointestinal: Normal bowel sounds, Soft and benign, Non-distended, No 

tenderness, No masses, No rebound, No guarding


Musculoskeletal: No erythema, No tenderness, No warmth


Integumentary: No tenderness/swelling, No erythema, No warmth, No cyanosis


Neurological: Normal speech, Normal strength at 5/5 x4 extr, Normal tone, 

Normal affect


Laboratory Data at Discharge: 














WBC  6.2 K/uL (4.3-10.9)   12/12/18  05:39    


 


Hgb  10.0 g/dL (12.0-15.0)  L  12/12/18  05:39    


 


Hct  28.8 % (36.0-45.0)  L  12/12/18  05:39    


 


Plt Count  338 K/uL (152-406)   12/12/18  05:39    


 


Sodium  134 mmol/L (136-145)  L  12/12/18  05:39    


 


Potassium  3.2 mmol/L (3.5-5.1)  L  12/12/18  10:09    


 


BUN  11 mg/dL (7-18)   12/12/18  05:39    


 


Creatinine  0.60 mg/dL (0.55-1.3)   12/12/18  05:39    


 


Glucose  106 mg/dL ()   12/12/18  05:39    


 


Phosphorus  2.6 mg/dL (2.5-4.9)   12/12/18  05:39    


 


Magnesium  1.6 mg/dL (1.8-2.4)  L  12/12/18  05:39    


 


Total Bilirubin  0.1 mg/dL (0.2-1.0)  L  12/12/18  05:39    


 


AST  10 U/L (15-37)  L  12/12/18  05:39    


 


ALT  15 U/L (12-78)   12/12/18  05:39    


 


Alkaline Phosphatase  62 U/L ()   12/12/18  05:39    


 


Lipase  645 U/L ()  H  12/11/18  05:51    








Home Medications: 








Escitalopram [Lexapro*] 10 mg PO DAILY 03/07/14 


Estradiol 1 tab PO BEDTIME 11/17/16 


Mesalamine [Lialda] 4 tab PO DAILY 11/17/16 


Omeprazole [Prilosec] 1 tab PO DAILY 11/17/16 


Progesterone,Micronized [Progesterone] 100 cap PO DAILY 11/17/16 


Budesonide/Formoterol Fumarate [Symbicort 160-4.5 Mcg Inhaler] 2 puff IH BID 12/ 11/18 


Estradiol [Minivelle] 1 each TD SEECOM 12/11/18 


Loperamide HCl [Anti-Diarrheal] 2 mg PO TID 12/11/18 


ALPRAZolam [Xanax] 0.25 mg PO BID PRN #10 tab 12/12/18 


Albuterol Inhaler [Ventolin Inhaler*] 2 puff IH TID PRN #1 hfa.aer.ad 12/12/18 


Aspirin [Aspirin EC 81 MG] 81 mg PO DAILY #90 tablet. 12/12/18 


Carvedilol [Coreg] 12.5 mg PO BID #60 tab 12/12/18 


Folic Acid 1 mg PO DAILY #90 tablet 12/12/18 


Losartan Potassium 100 mg PO DAILY #30 tablet 12/12/18 


Thiamine HCl [Vitamin B-1*] 100 mg PO DAILY #90 tablet 12/12/18 





New Medications: 


Albuterol Inhaler [Ventolin Inhaler*] 2 puff IH TID PRN #1 hfa.aer.ad


 PRN Reason: Shortness Of Breath


ALPRAZolam [Xanax] 0.25 mg PO BID PRN #10 tab


 PRN Reason: Anxiety


Aspirin [Aspirin EC 81 MG] 81 mg PO DAILY #90 tablet.


Carvedilol [Coreg] 12.5 mg PO BID #60 tab


Folic Acid 1 mg PO DAILY #90 tablet


Losartan Potassium 100 mg PO DAILY #30 tablet


Thiamine HCl [Vitamin B-1*] 100 mg PO DAILY #90 tablet


Patient Discharge Instructions: 1.  Patient will need to follow up the PCP in 1 

week to follow up this hospitalization.  2.  Patient presented with confusion.  

Patient with history of alcohol abuse and malnutrition.  Patient also with 

hyponatremia and hypokalemia.  Patient admitted for IV fluid hydration, 

electrolyte correction, and further evaluation.  Stroke protocol MRI 

unremarkable.  CT of the head also unremarkable.  Patient initially treated for 

UTI but this was ruled out.  Patient improved with IV fluid hydration.  Patient 

at risk for TIA and stroke in the future due to her history of hypertension and 

alcohol abuse.  Physical therapy has worked with the patient.  Fall precautions 

in place.  Patient back to her baseline mentation. At discharge she will 

continue with thiamine 100 mg daily and folic acid 1 mg daily for her alcohol 

abuse and malnutrition.  Patient will also continue with aspirin 81 mg daily 

due to risk of TIA and stroke.  At discharge sister plans to take the patient 

to alcohol rehab center in [facility] if patient agrees.  Patient encouraged 

to discontinue alcohol.  Patient is willing but reports that she will likely 

continue with alcohol.  Her sister will continue to help support the patient.  

Recommendation is for the patient to have a fasting lipid panel checked in 4-6 

weeks to consider statin medication.  Patient will be provided a limited supply 

of Xanax 0.25 mg 1 pill twice daily as needed for anxiety.  Patient should 

follow up with her PCP in 1 week to follow up this hospitalization.  

Recommendation to recheck lab-BMP in 1 week.  3.  Patient has hypertension.  

Medications adjusted due to hyponatremia, hypokalemia and elevated blood 

pressure.  Hydrochlorothiazide discontinued.  Carvedilol was added.  At 

discharge she will continue with losartan 100 mg daily and carvedilol 12.5 mg 

twice daily.  Recommendation is to maintain blood pressures less 150/80.  

Further adjustment can be done by her PCP.  Recommendation to recheck lab-BMP 

within 1 week to monitor her progress.  4.  Patient also found to have fatty 

liver.  Lipase levels were slightly elevated but patient without any symptoms.  

Patient with history of colitis.  She takes Lialda.  Patient may continue with 

her medication.  Education on fatty liver will be provided.  Patient may follow 

up with GI as an outpatient to further monitor.  Alcohol cessation recommended.

  5.  Patient with depression and anxiety.  She will continue with her 

medication-Lexapro 10 mg daily.  A limited supply of Xanax 0.25 mg 1 pill twice 

daily as needed for anxiety will be provided.  Patient may benefit with 

psychiatric evaluation as an outpatient.  6.  Alcohol cessation addressed in 

detail.  Fall precautions in place.  7.  Patient likely has GERD.  Patient will 

continue with Prilosec 40 mg daily.  Recommendation to follow up with GI as an 

outpatient to further evaluate.  8.  Patient with history of COPD and tobacco 

abuse.  Tobacco cessation addressed in detail.  Patient may continue with her 

medication of Symbicort 2 puffs twice daily and albuterol 2 puffs 3 times a day 

as needed for shortness of breath.  Patient may follow up with pulmonology as 

an outpatient to further monitor.  9.  At discharge Flexeril and ibuprofen will 

be discontinued.  10.  Patient also sees gynecology and takes hormone 

medication.  She may continue with this.


Diet: AHA


Activity: Fall precautions


Time spent managing pt's care (in minutes): 55

## 2018-12-14 ENCOUNTER — HOSPITAL ENCOUNTER (OUTPATIENT)
Dept: HOSPITAL 97 - ER | Age: 53
Setting detail: OBSERVATION
LOS: 3 days | Discharge: HOME | End: 2018-12-17
Attending: INTERNAL MEDICINE | Admitting: INTERNAL MEDICINE
Payer: COMMERCIAL

## 2018-12-14 VITALS — BODY MASS INDEX: 27.1 KG/M2

## 2018-12-14 DIAGNOSIS — I10: ICD-10-CM

## 2018-12-14 DIAGNOSIS — F17.210: ICD-10-CM

## 2018-12-14 DIAGNOSIS — G93.40: Primary | ICD-10-CM

## 2018-12-14 DIAGNOSIS — F04: ICD-10-CM

## 2018-12-14 DIAGNOSIS — F10.239: ICD-10-CM

## 2018-12-14 DIAGNOSIS — D64.9: ICD-10-CM

## 2018-12-14 DIAGNOSIS — E83.42: ICD-10-CM

## 2018-12-14 LAB
ALBUMIN SERPL BCP-MCNC: 3.5 G/DL (ref 3.4–5)
ALP SERPL-CCNC: 84 U/L (ref 45–117)
ALT SERPL W P-5'-P-CCNC: 24 U/L (ref 12–78)
AST SERPL W P-5'-P-CCNC: 20 U/L (ref 15–37)
BUN BLD-MCNC: 11 MG/DL (ref 7–18)
GLUCOSE SERPLBLD-MCNC: 92 MG/DL (ref 74–106)
HCT VFR BLD CALC: 35 % (ref 36–45)
INR BLD: 1.07
LIPASE SERPL-CCNC: 442 U/L (ref 73–393)
LYMPHOCYTES # SPEC AUTO: 1.8 K/UL (ref 0.7–4.9)
MAGNESIUM SERPL-MCNC: 1.2 MG/DL (ref 1.8–2.4)
MCH RBC QN AUTO: 33.9 PG (ref 27–35)
MCV RBC: 99.2 FL (ref 80–100)
METHAMPHET UR QL SCN: NEGATIVE
NT-PROBNP SERPL-MCNC: 857 PG/ML (ref ?–125)
PMV BLD: 7.6 FL (ref 7.6–11.3)
POTASSIUM SERPL-SCNC: 3.5 MMOL/L (ref 3.5–5.1)
RBC # BLD: 3.53 M/UL (ref 3.86–4.86)
THC SERPL-MCNC: NEGATIVE NG/ML
TROPONIN (EMERG DEPT USE ONLY): < 0.02 NG/ML (ref 0–0.04)

## 2018-12-14 PROCEDURE — 81025 URINE PREGNANCY TEST: CPT

## 2018-12-14 PROCEDURE — 85025 COMPLETE CBC W/AUTO DIFF WBC: CPT

## 2018-12-14 PROCEDURE — 96368 THER/DIAG CONCURRENT INF: CPT

## 2018-12-14 PROCEDURE — 85730 THROMBOPLASTIN TIME PARTIAL: CPT

## 2018-12-14 PROCEDURE — 83735 ASSAY OF MAGNESIUM: CPT

## 2018-12-14 PROCEDURE — 83880 ASSAY OF NATRIURETIC PEPTIDE: CPT

## 2018-12-14 PROCEDURE — 80307 DRUG TEST PRSMV CHEM ANLYZR: CPT

## 2018-12-14 PROCEDURE — 36415 COLL VENOUS BLD VENIPUNCTURE: CPT

## 2018-12-14 PROCEDURE — 96365 THER/PROPH/DIAG IV INF INIT: CPT

## 2018-12-14 PROCEDURE — 84132 ASSAY OF SERUM POTASSIUM: CPT

## 2018-12-14 PROCEDURE — 96367 TX/PROPH/DG ADDL SEQ IV INF: CPT

## 2018-12-14 PROCEDURE — 99285 EMERGENCY DEPT VISIT HI MDM: CPT

## 2018-12-14 PROCEDURE — 83690 ASSAY OF LIPASE: CPT

## 2018-12-14 PROCEDURE — 80320 DRUG SCREEN QUANTALCOHOLS: CPT

## 2018-12-14 PROCEDURE — 84484 ASSAY OF TROPONIN QUANT: CPT

## 2018-12-14 PROCEDURE — 71045 X-RAY EXAM CHEST 1 VIEW: CPT

## 2018-12-14 PROCEDURE — 80053 COMPREHEN METABOLIC PANEL: CPT

## 2018-12-14 PROCEDURE — 85610 PROTHROMBIN TIME: CPT

## 2018-12-14 PROCEDURE — 81003 URINALYSIS AUTO W/O SCOPE: CPT

## 2018-12-14 PROCEDURE — 80076 HEPATIC FUNCTION PANEL: CPT

## 2018-12-14 PROCEDURE — 96375 TX/PRO/DX INJ NEW DRUG ADDON: CPT

## 2018-12-14 PROCEDURE — 93005 ELECTROCARDIOGRAM TRACING: CPT

## 2018-12-14 PROCEDURE — 80329 ANALGESICS NON-OPIOID 1 OR 2: CPT

## 2018-12-14 PROCEDURE — 80048 BASIC METABOLIC PNL TOTAL CA: CPT

## 2018-12-14 PROCEDURE — 94760 N-INVAS EAR/PLS OXIMETRY 1: CPT

## 2018-12-14 RX ADMIN — CARVEDILOL SCH MG: 12.5 TABLET, FILM COATED ORAL at 23:25

## 2018-12-14 NOTE — ER
Nurse's Notes                                                                                     

 [facility]                                                                

Name: Edna Rainey                                                                             

Age: 53 yrs                                                                                       

Sex: Female                                                                                       

: 1965                                                                                   

MRN: [id number]                                                                                   

Arrival Date: 2018                                                                          

Time: 17:35                                                                                       

Account#: [id number]                                                                            

Bed 28                                                                                            

Private MD:                                                                                       

Diagnosis: Essential (primary) hypertension;Alcohol abuse;Altered mental status,                  

  unspecified;Hypomagnesemia                                                                      

                                                                                                  

Presentation:                                                                                     

                                                                                             

18:04 Presenting complaint: Patient states: "My blood pressure is high". Pt has no other      ss  

      complaints, however sister believes that she has been becoming increasingly confused        

      over the past day and a half. Sister also reports that patient was seen and admitted a      

      few days ago for confusion after withdrawing from ETOH. Pt and family member deny any       

      further ETOH consumption since previous admission. Transition of care: patient was not      

      received from another setting of care. Onset of symptoms is unknown. Risk Assessment:       

      Do you want to hurt yourself or someone else? Patient reports no desire to harm self or     

      others. Initial Sepsis Screen: Does the patient meet any 2 criteria? No. Patient's          

      initial sepsis screen is negative. Does the patient have a suspected source of              

      infection? No. Patient's initial sepsis screen is negative. Care prior to arrival: None.    

18:04 Method Of Arrival: Ambulatory                                                           ss  

18:04 Acuity: DAYANARA 3                                                                           ss  

                                                                                                  

OB/GYN:                                                                                           

20:39 lmp unknown                                                                             mg2 

                                                                                                  

Historical:                                                                                       

- Allergies:                                                                                      

18:04 No Known Allergies;                                                                     ss  

- Home Meds:                                                                                      

22:23 cyclobenzaprine 10 mg Oral tab 1 tab daily [Active]; estradiol 4 mg Oral tab 1 tab once mg2 

      daily [Active]; hydrocodone-acetaminophen  mg Oral tab 1 tab every 6 hours            

      [Active]; losartan-hydrochlorothiazide 100-12.5 mg Oral tab 1 tab once daily [Active];      

      Tylenol-Codeine #3 300-30 mg Oral tab 1 tab every 4-6 hours [Active];                       

- PMHx:                                                                                           

18:04 abscess in the GI tract; Arthritis; chron's disease; Colitis; Degenerative disc         ss  

      disease; Diverticulitis; ETOH abuse;                                                        

                                                                                                  

- Immunization history:: Adult Immunizations up to date.                                          

- Social history:: Smoking status: Patient uses tobacco products, smokes one pack                 

  cigarettes per day.                                                                             

- Ebola Screening: : Patient denies exposure to infectious person Patient denies travel           

  to an Ebola-affected area in the 21 days before illness onset.                                  

                                                                                                  

                                                                                                  

Screenin:18 Abuse screen: Denies threats or abuse. Denies injuries from another. Nutritional        mg2 

      screening: No deficits noted. Tuberculosis screening: No symptoms or risk factors           

      identified. Fall Risk IV access (20 points).                                                

                                                                                                  

Assessment:                                                                                       

19:17 General: Appears in no apparent distress. comfortable, Behavior is calm, cooperative.   mg2 

      Pain: Denies pain. Neuro: Level of Consciousness is awake, alert, obeys commands,           

      Oriented to person, place, time, situation. Cardiovascular: Capillary refill < 3            

      seconds Patient's skin is warm and dry. Respiratory: Airway is patent Respiratory           

      effort is even, unlabored, Respiratory pattern is regular, symmetrical. GI: No signs        

      and/or symptoms were reported involving the gastrointestinal system. : No signs           

      and/or symptoms were reported regarding the genitourinary system. EENT: No deficits         

      noted. Derm: Skin is intact, is healthy with good turgor, Skin is pink, warm \T\ dry.       

      normal. Musculoskeletal: No signs and/or symptoms reported regarding the                    

      musculoskeletal system.                                                                     

20:42 Reassessment: Patient appears in no apparent distress at this time. Patient and/or      mg2 

      family updated on plan of care and expected duration. Pain level reassessed. Patient is     

      alert, oriented x 3, equal unlabored respirations, skin warm/dry/pink. family informed      

      about the need for admission, family agreed.                                                

                                                                                                  

Vital Signs:                                                                                      

18:02  / 101; Pulse 87; Resp 18; Temp 97.8(TE); Pulse Ox 97% on R/A; Weight 70.31 kg;   ss  

      Height 5 ft. 3 in. (160.02 cm); Pain 0/10;                                                  

19:20  / 114; Pulse 80; Resp 18; Pulse Ox 100% on R/A; Pain 0/10;                       mg2 

20:02  / 94; Pulse 81; Resp 18; Pulse Ox 100% on R/A; Pain 0/10;                        mg2 

20:39  / 84; Pulse 79; Resp 18; Pulse Ox 100% on R/A; Pain 0/10;                        mg2 

21:10  / 66; Pulse 91; Resp 18; Pulse Ox 97% on R/A; Pain 0/10;                         mg2 

21:48  / 88; Pulse 83; Resp 18; Pulse Ox 98% on R/A; Pain 0/10;                         mg2 

18:02 Body Mass Index 27.46 (70.31 kg, 160.02 cm)                                               

                                                                                                  

ED Course:                                                                                        

17:35 Patient arrived in ED.                                                                  ds1 

18:02 Arm band placed on right wrist.                                                         ss  

18:07 Triage completed.                                                                       ss  

18:12 Wyatt Shoemaker MD is Attending Physician.                                             mirela 

18:34 Elijah Kaiser, BOB is Primary Nurse.                                                  mg2 

18:45 XRAY Chest (1 view) In Process Unspecified.                                             EDMS

19:19 No provider procedures requiring assistance completed. Inserted saline lock: 20 gauge   mg2 

      in left antecubital area, using aseptic technique. Blood collected.                         

19:30 Bijan Alonzo MD is Hospitalizing Provider.                                          mirela 

22:23 Patient has correct armband on for positive identification.                             mg2 

22:24 Patient admitted, IV remains in place.                                                  mg2 

                                                                                                  

Administered Medications:                                                                         

18:58 CANCELLED (Duplicate Order): NS 0.9% 1000 ml IV at 75 ml/hr continuous                  mirela 

19:17 Drug: Thiamine 100 mg Route: IV; Rate: bolus; Site: left antecubital;                   mg2 

22:46 Follow up: Response: No adverse reaction; IV Status: Completed infusion                 mg2 

19:17 Drug: Banana Bag - (NS 0.9% 1000 ml, foLIC Acid 1 mg, Thiamine 100 mg, Multivitamin 1   mg2 

      amp) Route: IV; Rate: 125 ml/hr; Site: left antecubital;                                    

22:45 Follow up: Response: No adverse reaction; IV Status: Infusion continued upon admission  mg2 

19:54 Drug: Magnesium Sulfate 2 grams Route: IVPB; Infused Over: 2 hrs; Site: left            kr2 

      antecubital;                                                                                

22:45 Follow up: Response: No adverse reaction; IV Status: Completed infusion                 mg2 

19:55 Drug: hydrALAZINE 10 mg Route: IV; Rate: per protocol; Site: left antecubital;          kr2 

22:45 Follow up: Response: No adverse reaction; IV Status: Completed infusion                 mg2 

19:55 Drug: hydrALAZINE 10 mg Route: PO;                                                      kr2 

22:44 Follow up: Response: No adverse reaction; Marked relief of symptoms                     mg2 

21:09 Drug: Valium 5 mg Route: IVP; Site: left antecubital;                                   mg2 

22:44 Follow up: Response: No adverse reaction; Marked relief of symptoms                     mg2 

                                                                                                  

                                                                                                  

Outcome:                                                                                          

19:32 Decision to Hospitalize by Provider.                                                    mirela 

22:24 Admitted to Med/surg accompanied by tech, via wheelchair, room 231, with chart, Report  mg2 

      called to  BOB Raymundo                                                                        

22:24 Condition: stable                                                                           

22:24 Instructed on the need for admit, Demonstrated understanding of instructions.               

22:46 Patient left the ED.                                                                    mg2 

                                                                                                  

Signatures:                                                                                       

Dispatcher [employer]                           Wyatt Rivera MD MD cha Sanford, Demi                                ds1                                                  

Kathryn Hoang RN                      RN   ss                                                   

Millie Pace RN                       RN   kr2                                                  

Elijah Kaiser RN                    RN   mg2                                                  

                                                                                                  

**************************************************************************************************

## 2018-12-14 NOTE — EDPHYS
Physician Documentation                                                                           

 [facility]                                                                

Name: Edna Rainey                                                                             

Age: 53 yrs                                                                                       

Sex: Female                                                                                       

: 1965                                                                                   

MRN: [id number]                                                                                   

Arrival Date: 2018                                                                          

Time: 17:35                                                                                       

Account#: [id number]                                                                            

Bed 28                                                                                            

Private MD:                                                                                       

ED Physician Wyatt Shoemaker                                                                      

HPI:                                                                                              

                                                                                             

18:59 This 53 yrs old  Female presents to ER via Ambulatory with complaints of High  mirela 

      Blood Pressure.                                                                             

18:59 The patient has elevated blood pressure and discovered this at home. Onset: The         mirela 

      symptoms/episode began/occurred 2 day(s) ago. Modifying factors: The symptoms are           

      aggravated by activity, The symptoms are alleviated by remaining still. Associated          

      signs and symptoms: Pertinent positives: dizziness, nausea, confusion. Severity of          

      symptoms: At its worst the blood pressure was mild, earlier today, moderate.                

                                                                                                  

OB/GYN:                                                                                           

20:39 lmp unknown                                                                             mg2 

                                                                                                  

Historical:                                                                                       

- Allergies:                                                                                      

18:04 No Known Allergies;                                                                     ss  

- Home Meds:                                                                                      

22:23 cyclobenzaprine 10 mg Oral tab 1 tab daily [Active]; estradiol 4 mg Oral tab 1 tab once mg2 

      daily [Active]; hydrocodone-acetaminophen  mg Oral tab 1 tab every 6 hours            

      [Active]; losartan-hydrochlorothiazide 100-12.5 mg Oral tab 1 tab once daily [Active];      

      Tylenol-Codeine #3 300-30 mg Oral tab 1 tab every 4-6 hours [Active];                       

- PMHx:                                                                                           

18:04 abscess in the GI tract; Arthritis; chron's disease; Colitis; Degenerative disc         ss  

      disease; Diverticulitis; ETOH abuse;                                                        

                                                                                                  

- Immunization history:: Adult Immunizations up to date.                                          

- Social history:: Smoking status: Patient uses tobacco products, smokes one pack                 

  cigarettes per day.                                                                             

- Ebola Screening: : Patient denies exposure to infectious person Patient denies travel           

  to an Ebola-affected area in the 21 days before illness onset.                                  

                                                                                                  

                                                                                                  

ROS:                                                                                              

19:02 Constitutional: Negative for fever, chills, and weight loss, Eyes: Negative for injury, mirela 

      pain, redness, and discharge, ENT: Negative for injury, pain, and discharge, Neck:          

      Negative for injury, pain, and swelling, Cardiovascular: Negative for chest pain,           

      palpitations, and edema, Respiratory: Negative for shortness of breath, cough,              

      wheezing, and pleuritic chest pain, Abdomen/GI: Negative for abdominal pain, nausea,        

      vomiting, diarrhea, and constipation, Back: Negative for injury and pain, : Negative      

      for injury, bleeding, discharge, and swelling, MS/Extremity: Negative for injury and        

      deformity, Skin: Negative for injury, rash, and discoloration, Psych: Negative for          

      depression, anxiety, suicide ideation, homicidal ideation, and hallucinations,              

      Allergy/Immunology: Negative for hives, rash, and allergies, Endocrine: Negative for        

      neck swelling, polydipsia, polyuria, polyphagia, and marked weight changes,                 

      Hematologic/Lymphatic: Negative for swollen nodes, abnormal bleeding, and unusual           

      bruising.                                                                                   

19:02 Neuro: Positive for altered mental status, weakness.                                        

                                                                                                  

Exam:                                                                                             

19:02 Constitutional:  This is a well developed, well nourished patient who is awake, alert,  mirela 

      and in no acute distress. Head/Face:  Normocephalic, atraumatic. Eyes:  Pupils equal        

      round and reactive to light, extra-ocular motions intact.  Lids and lashes normal.          

      Conjunctiva and sclera are non-icteric and not injected.  Cornea within normal limits.      

      Periorbital areas with no swelling, redness, or edema. ENT:  Nares patent. No nasal         

      discharge, no septal abnormalities noted.  Tympanic membranes are normal and external       

      auditory canals are clear.  Oropharynx with no redness, swelling, or masses, exudates,      

      or evidence of obstruction, uvula midline.  Mucous membranes moist. Neck:  Trachea          

      midline, no thyromegaly or masses palpated, and no cervical lymphadenopathy.  Supple,       

      full range of motion without nuchal rigidity, or vertebral point tenderness.  No            

      Meningismus. Chest/axilla:  Normal chest wall appearance and motion.  Nontender with no     

      deformity.  No lesions are appreciated. Cardiovascular:  Regular rate and rhythm with a     

      normal S1 and S2.  No gallops, murmurs, or rubs.  Normal PMI, no JVD.  No pulse             

      deficits. Respiratory:  Lungs have equal breath sounds bilaterally, clear to                

      auscultation and percussion.  No rales, rhonchi or wheezes noted.  No increased work of     

      breathing, no retractions or nasal flaring. Abdomen/GI:  Soft, non-tender, with normal      

      bowel sounds.  No distension or tympany.  No guarding or rebound.  No evidence of           

      tenderness throughout. Back:  No spinal tenderness.  No costovertebral tenderness.          

      Full range of motion. Skin:  Warm, dry with normal turgor.  Normal color with no            

      rashes, no lesions, and no evidence of cellulitis. MS/ Extremity:  Pulses equal, no         

      cyanosis.  Neurovascular intact.  Full, normal range of motion. Neuro:  Awake and           

      alert, GCS 15, oriented to person, place, time, and situation.  Cranial nerves II-XII       

      grossly intact.  Motor strength 5/5 in all extremities.  Sensory grossly intact.            

      Cerebellar exam normal.  Normal gait. Psych:  Awake, alert, with orientation to person,     

      place and time.  Behavior, mood, and affect are within normal limits.                       

19:02 Musculoskeletal/extremity: Extremities: all appear grossly normal, with no appreciated      

      pain with palpation, ROM: no acute changes, intact in all extremities, full active          

      range of motion, full passive range of motion, Circulation is intact in all                 

      extremities. Sensation intact. Compartment Syndrome exam of affected extremity: is          

      normal. DVT Exam: no pain, no swelling, no tenderness, negative Homans' sign noted on       

      exam, no appreciated bluish discoloration, no erythema, no increased warmth.                

                                                                                                  

Vital Signs:                                                                                      

18:02  / 101; Pulse 87; Resp 18; Temp 97.8(TE); Pulse Ox 97% on R/A; Weight 70.31 kg;   ss  

      Height 5 ft. 3 in. (160.02 cm); Pain 0/10;                                                  

19:20  / 114; Pulse 80; Resp 18; Pulse Ox 100% on R/A; Pain 0/10;                       mg2 

20:02  / 94; Pulse 81; Resp 18; Pulse Ox 100% on R/A; Pain 0/10;                        mg2 

20:39  / 84; Pulse 79; Resp 18; Pulse Ox 100% on R/A; Pain 0/10;                        mg2 

21:10  / 66; Pulse 91; Resp 18; Pulse Ox 97% on R/A; Pain 0/10;                         mg2 

21:48  / 88; Pulse 83; Resp 18; Pulse Ox 98% on R/A; Pain 0/10;                         mg2 

18:02 Body Mass Index 27.46 (70.31 kg, 160.02 cm)                                               

                                                                                                  

MDM:                                                                                              

18:12 Patient medically screened.                                                             [facility] 

19:04 Data reviewed: vital signs, nurses notes, lab test result(s), EKG, radiologic studies,  [facility] 

      CT scan, MRI, plain films.                                                                  

                                                                                                  

                                                                                             

18:22 Order name: Basic Metabolic Panel                                                       [facility] 

                                                                                             

18:22 Order name: CBC with Diff                                                               [facility] 

                                                                                             

18:22 Order name: LFT's                                                                       [facility] 

                                                                                             

18:22 Order name: Magnesium; Complete Time: 19:29                                             [facility] 

                                                                                             

18:22 Order name: NT PRO-BNP; Complete Time: 19:29                                            [facility] 

                                                                                             

18:22 Order name: PT-INR; Complete Time: 19:26                                                [facility] 

                                                                                             

18:22 Order name: Troponin (emerg Dept Use Only); Complete Time: 19:29                        [facility] 

                                                                                             

18:22 Order name: Lipase; Complete Time: 19:29                                                [facility] 

                                                                                             

18:23 Order name: Basic Metabolic Panel; Complete Time: 19:29                                 [facility]

                                                                                             

18:23 Order name: CBC with Automated Diff; Complete Time: 19:26                               [facility]

                                                                                             

18:23 Order name: Liver (Hepatic) Function; Complete Time: 19:29                              [facility]

                                                                                             

19:14 Order name: Acetaminophen                                                               [facility] 

                                                                                             

19:14 Order name: ETOH Level                                                                  [facility] 

                                                                                             

19:14 Order name: Ptt, Activated                                                              [facility] 

                                                                                             

18:22 Order name: XRAY Chest (1 view)                                                         [facility] 

                                                                                             

18:22 Order name: EKG; Complete Time: 18:23                                                   [facility] 

                                                                                             

18:22 Order name: Cardiac monitoring; Complete Time: 19:03                                    [facility] 

                                                                                             

18:22 Order name: EKG - Nurse/Tech; Complete Time: 19:03                                      [facility] 

                                                                                             

18:22 Order name: IV Saline Lock; Complete Time: 19:03                                        [facility] 

                                                                                             

19:14 Order name: Salicylate                                                                  [facility] 

                                                                                             

19:14 Order name: Urine Drug Screen                                                           [facility] 

                                                                                             

20:12 Order name: Urine Dipstick--Ancillary (enter results)                                   [facility] 

                                                                                             

20:12 Order name: Urine Pregnancy--Ancillary (enter results)                                  [facility] 

                                                                                             

18:22 Order name: Labs collected and sent; Complete Time: 19:17                               [facility] 

                                                                                             

18:22 Order name: O2 Per Protocol; Complete Time: 19:17                                       [facility] 

                                                                                             

18:22 Order name: O2 Sat Monitoring; Complete Time: 19:17                                     [facility] 

                                                                                             

19:14 Order name: Urine Dipstick-Ancillary (obtain specimen); Complete Time: 20:01            [facility] 

                                                                                                  

Administered Medications:                                                                         

18:58 CANCELLED (Duplicate Order): NS 0.9% 1000 ml IV at 75 ml/hr continuous                  mirela 

19:17 Drug: Thiamine 100 mg Route: IV; Rate: bolus; Site: left antecubital;                   mg2 

22:46 Follow up: Response: No adverse reaction; IV Status: Completed infusion                 mg2 

19:17 Drug: Banana Bag - (NS 0.9% 1000 ml, foLIC Acid 1 mg, Thiamine 100 mg, Multivitamin 1   mg2 

      amp) Route: IV; Rate: 125 ml/hr; Site: left antecubital;                                    

22:45 Follow up: Response: No adverse reaction; IV Status: Infusion continued upon admission  mg2 

19:54 Drug: Magnesium Sulfate 2 grams Route: IVPB; Infused Over: 2 hrs; Site: left            kr2 

      antecubital;                                                                                

22:45 Follow up: Response: No adverse reaction; IV Status: Completed infusion                 mg2 

19:55 Drug: hydrALAZINE 10 mg Route: IV; Rate: per protocol; Site: left antecubital;          kr2 

22:45 Follow up: Response: No adverse reaction; IV Status: Completed infusion                 mg2 

19:55 Drug: hydrALAZINE 10 mg Route: PO;                                                      kr2 

22:44 Follow up: Response: No adverse reaction; Marked relief of symptoms                     mg2 

21:09 Drug: Valium 5 mg Route: IVP; Site: left antecubital;                                   mg2 

22:44 Follow up: Response: No adverse reaction; Marked relief of symptoms                     mg2 

                                                                                                  

                                                                                                  

Disposition:                                                                                      

18 19:32 Hospitalization ordered by Bijan Alonzo for Inpatient Admission. Preliminary    

  diagnosis are Essential (primary) hypertension, Alcohol abuse, Altered mental                   

  status, unspecified, Hypomagnesemia.                                                            

- Bed requested for Telemetry/MedSurg (Inpatient).                                                

- Status is Inpatient Admission.                                                              mg2 

- Condition is Fair.                                                                              

- Problem is new.                                                                                 

- Symptoms have improved.                                                                         

UTI on Admission? No                                                                              

                                                                                                  

                                                                                                  

                                                                                                  

Signatures:                                                                                       

Dispatcher [employer]                           EDOxana Tabor RN RN kl Anderson, Corey, MD MD cha Smirch, Shelby, RN RN ss Reaves, Karey, RN RN   kr2                                                  

Elijah Kaiser RN RN   mg2                                                  

                                                                                                  

Corrections: (The following items were deleted from the chart)                                    

18:58 18:23 NS 0.9% 1000 ml IV at 75 ml/hr continuous ordered. mirela                            mirela 

22:02 19:32 Hospitalization Ordered by Bijan Alonzo MD for Inpatient Admission. Preliminary kl  

      diagnosis is Essential (primary) hypertension; Alcohol abuse; Altered mental status,        

      unspecified; Hypomagnesemia. Bed requested for Telemetry/MedSurg (Inpatient). Status is     

      Inpatient Admission. Condition is Fair. Problem is new. Symptoms have improved. UTI on      

      Admission? No. mirela                                                                          

22:46 22:02 2018 19:32 Hospitalization Ordered by Bijan Alonzo MD for Inpatient       mg2 

      Admission. Preliminary diagnosis is Essential (primary) hypertension; Alcohol abuse;        

      Altered mental status, unspecified; Hypomagnesemia. Bed requested for Telemetry/MedSurg     

      (Inpatient). Status is Inpatient Admission. Condition is Fair. Problem is new. Symptoms     

      have improved. UTI on Admission? No.                                                      

                                                                                                  

**************************************************************************************************

## 2018-12-14 NOTE — P.HP
Certification for Inpatient


Patient admitted to: Observation


With expected LOS: <2 Midnights


Practitioner: I am a practitioner with admitting privileges, knowledge of 

patient current condition, hospital course, and medical plan of care.


Services: Services provided to patient in accordance with Admission 

requirements found in Title 42 Section 412.3 of the Code of Federal Regulations





Patient History


Date of Service: 12/14/18


Reason for admission: encephalopathy


History of Present Illness: 





Ms Rainey is a 53 years old woman, with history of alcohol abuse, HTN, tobacco 

abuse, who was recently admitted to the hospital due to AMS, electrolyte 

disturbance, who was dishcarged home 2 days ago. Since so, she has been 

somewhat confused, her BP was difficult to control. Family member says that she 

has not been drinking alcohol since discharge, today alcohol level is 11. Her 

family got concern since the blood pressure was difficult to decrease, despite 

to take her medication as it was indicated. No history of fever or chills. At 

my encounter the patient was cooperative, but she definitively has memory loss 

problems.


Allergies





No Known Allergies Allergy (Verified 12/11/18 00:04)


 





Home medications list reviewed: Yes


Home Medications: 








Escitalopram [Lexapro*] 10 mg PO DAILY 03/07/14 


Estradiol 1 tab PO BEDTIME 11/17/16 


Mesalamine [Lialda] 4 tab PO DAILY 11/17/16 


Omeprazole [Prilosec] 1 tab PO DAILY 11/17/16 


Progesterone,Micronized [Progesterone] 100 cap PO DAILY 11/17/16 


Budesonide/Formoterol Fumarate [Symbicort 160-4.5 Mcg Inhaler] 2 puff IH BID 12/ 11/18 


Estradiol [Minivelle] 1 each TD SEECOM 12/11/18 


Loperamide HCl [Anti-Diarrheal] 2 mg PO TID 12/11/18 


ALPRAZolam [Xanax] 0.25 mg PO BID PRN #10 tab 12/12/18 


Albuterol Inhaler [Ventolin Inhaler*] 2 puff IH TID PRN #1 hfa.aer.ad 12/12/18 


Aspirin [Aspirin EC 81 MG] 81 mg PO DAILY #90 tablet. 12/12/18 


Carvedilol [Coreg] 12.5 mg PO BID #60 tab 12/12/18 


Folic Acid 1 mg PO DAILY #90 tablet 12/12/18 


Losartan Potassium 100 mg PO DAILY #30 tablet 12/12/18 


Thiamine HCl [Vitamin B-1*] 100 mg PO DAILY #90 tablet 12/12/18 








- Past Medical/Surgical History


Diabetic: No


-: HTN


-: Menopausal symptoms


-: Tobacco abuse


-: intestinal abscess-removed hemicolectomy.


-: colitis/diverticulitis


-: alcohol abuse


-: Tubal ligation


-: Ganglion cyst removal time s2 on the left


-: Foot surgery


-: Chest tube placement after falling off the horse


Psychosocial/ Personal History: , Dogs-2 and one guinea pig. Work-Lab 

tech





- Family History


  ** Father


-: Hypertension





  ** Mother


-: Hypertension





  ** Brother


-: Other (see notes)


Notes: gout





- Social History


Smoking Status: Current every day smoker


Counseled patient to stop smoking for: less than 10 minutes


Smoking therapy provided: Yes


Patient receptive to therapy: Yes


Alcohol use: Yes


CD- Drugs: No


Caffeine use: Yes


Place of Residence: Home





Review of Systems


10-point ROS is otherwise unremarkable





Physical Examination





- Physical Exam


General: Alert, Confused


HEENT: Atraumatic, PERRLA, Mucous membr. moist/pink, EOMI, Sclerae nonicteric


Neck: Supple, 2+ carotid pulse no bruit, No LAD, Without JVD or thyroid 

abnormality


Respiratory: Clear to auscultation bilaterally, Normal air movement


Cardiovascular: Regular rate/rhythm, Normal S1 S2


Gastrointestinal: Normal bowel sounds, No tenderness


Musculoskeletal: No tenderness


Integumentary: No rashes


Neurological: Normal speech, Normal strength at 5/5 x4 extr, Normal tone, 

Normal affect


Lymphatics: No axilla or inguinal lymphadenopathy





- Studies


Laboratory Data (last 24 hrs)





12/14/18 18:45: APTT 33.3


12/14/18 18:45: PT 12.6 H, INR 1.07


12/14/18 18:45: WBC 7.1  D, Hgb 12.0, Hct 35.0 L D, Plt Count 411 H D


12/14/18 18:45: Sodium 134 L, Potassium 3.5, BUN 11, Creatinine 0.80, Glucose 92

, Magnesium 1.2 L*, Total Bilirubin 0.2, AST 20, ALT 24, Alkaline Phosphatase 84

, Lipase 442 H








Assessment and Plan





- Problems (Diagnosis)


(1) Acute encephalopathy


Current Visit: No   Status: Acute   





(2) Hypomagnesemia


Current Visit: No   Status: Acute   





(3) Hypertensive disorder, systemic arterial


Onset Date: 11/18/16   Current Visit: No   Status: Chronic   





- Plan





The patient will be admitted to the hospital due to encephalopathy. 

Differential diagnosis include alcohol withdrawal vs Korsakoff syndrome. Will 

continue with banana bag, start librium, consult social service for alcoholism 

rehab referral. She will need a neurologist evaluation upon discharge. We can 

make an appointment with our local neurologist to ensure follow up. Fall 

precautions ordered.





- Advance Directives


Does patient have a Living Will: No


Does patient have a Durable POA for Healthcare: No





- Code Status/Comfort Care


Code Status Assessed: Yes


Code Status: Full Code

## 2018-12-15 LAB
BUN BLD-MCNC: 11 MG/DL (ref 7–18)
GLUCOSE SERPLBLD-MCNC: 100 MG/DL (ref 74–106)
HCT VFR BLD CALC: 28.5 % (ref 36–45)
LYMPHOCYTES # SPEC AUTO: 1.6 K/UL (ref 0.7–4.9)
MCH RBC QN AUTO: 34.6 PG (ref 27–35)
MCV RBC: 99.1 FL (ref 80–100)
PMV BLD: 7.6 FL (ref 7.6–11.3)
POTASSIUM SERPL-SCNC: 3.3 MMOL/L (ref 3.5–5.1)
RBC # BLD: 2.87 M/UL (ref 3.86–4.86)

## 2018-12-15 RX ADMIN — CARVEDILOL SCH MG: 12.5 TABLET, FILM COATED ORAL at 20:37

## 2018-12-15 RX ADMIN — Medication SCH ML: at 09:07

## 2018-12-15 RX ADMIN — PANTOPRAZOLE SODIUM SCH MG: 40 TABLET, DELAYED RELEASE ORAL at 06:28

## 2018-12-15 RX ADMIN — Medication SCH ML: at 20:37

## 2018-12-15 RX ADMIN — ASPIRIN SCH MG: 81 TABLET, COATED ORAL at 09:07

## 2018-12-15 RX ADMIN — MESALAMINE SCH MG: 400 CAPSULE, DELAYED RELEASE ORAL at 12:06

## 2018-12-15 RX ADMIN — LOSARTAN POTASSIUM SCH MG: 50 TABLET, FILM COATED ORAL at 09:06

## 2018-12-15 RX ADMIN — CARVEDILOL SCH MG: 12.5 TABLET, FILM COATED ORAL at 09:06

## 2018-12-15 RX ADMIN — SODIUM CHLORIDE SCH MLS: 9 INJECTION, SOLUTION INTRAVENOUS at 09:06

## 2018-12-15 NOTE — PN
Date of Progress Note:  12/15/2018



History:  The patient seen and examined.  Chart reviewed and case discussed with RN.  Sister at the [facility].  Treatment plan explained and all questions answered.  The patient does appear to be confabul
ating.



Code Status:  Full.



Physical Examination:

Vital Signs:  Temperature 97.9, heart rate 79, blood pressure 155/77, respirations 20, O2 98% on room
 air. 

General:  Awake, alert, oriented x3.  No acute distress.  Appears older than the stated age, ill-appe
aring female. 

CV:  S1, S2.  Regular rate and rhythm.  Peripheral pulses present.  No murmurs. 

Respiratory:  Moving air well bilaterally.  No wheezing or stridor. 

Gastrointestinal:  Abdomen is soft, nontender, nondistended.  Positive bowel sounds. 

Extremities:  No clubbing, cyanosis, edema. 

Neuro:  Nonfocal. 

Psych:  Mood is okay.  Affect is flat.  Insight and judgment were poor.



Laboratory Data:  Sodium 137, potassium 3.3, chloride 105, CO2 24, BUN 11, creatinine 0.8, glucose 10
0, magnesium 1.6.  WBC 5.2, H and H 9.9 and 28.5, platelets 349.



Assessment And Plan:  A 53-year-old female with:

1.Acute encephalopathy, likely related to her alcohol withdrawal and possible Korsakoff's syndrome. 
 We will continue with Librium taper.  Continue multivitamin.

2.Hypomagnesemia.  Replace and monitor.

3.Elevated hypertension.  We will resume home medications as appropriate.

4.Nicotine dependence with cigarette smoking, counseled.

5.Alcohol dependence.  Alcohol level is 11.  We will watch for alcohol withdrawal symptoms and bladimir
nue with Librium taper.  Ativan p.r.n.



Plan:  Detox and refer to alcohol rehab.  Sister has been working on getting the patient to a rehab f
acility.  We will consult .  The patient will need a psychiatric and neurological eval
uation as outpatient.





SA/MODL

DD:  12/15/2018 13:58:10Voice ID:  [id number]

DT:  12/15/2018 17:36:40Report ID:  [id number]

## 2018-12-16 VITALS — OXYGEN SATURATION: 99 %

## 2018-12-16 LAB
ALBUMIN SERPL BCP-MCNC: 2.9 G/DL (ref 3.4–5)
ALP SERPL-CCNC: 69 U/L (ref 45–117)
ALT SERPL W P-5'-P-CCNC: 19 U/L (ref 12–78)
AST SERPL W P-5'-P-CCNC: 12 U/L (ref 15–37)
BUN BLD-MCNC: 11 MG/DL (ref 7–18)
GLUCOSE SERPLBLD-MCNC: 89 MG/DL (ref 74–106)
HCT VFR BLD CALC: 27.8 % (ref 36–45)
LYMPHOCYTES # SPEC AUTO: 1.5 K/UL (ref 0.7–4.9)
MCH RBC QN AUTO: 34.9 PG (ref 27–35)
MCV RBC: 98.1 FL (ref 80–100)
PMV BLD: 7.3 FL (ref 7.6–11.3)
POTASSIUM SERPL-SCNC: 3.9 MMOL/L (ref 3.5–5.1)
RBC # BLD: 2.83 M/UL (ref 3.86–4.86)

## 2018-12-16 RX ADMIN — SODIUM CHLORIDE SCH MLS: 9 INJECTION, SOLUTION INTRAVENOUS at 10:03

## 2018-12-16 RX ADMIN — CARVEDILOL SCH MG: 12.5 TABLET, FILM COATED ORAL at 21:23

## 2018-12-16 RX ADMIN — LOSARTAN POTASSIUM SCH MG: 50 TABLET, FILM COATED ORAL at 10:01

## 2018-12-16 RX ADMIN — PANTOPRAZOLE SODIUM SCH MG: 40 TABLET, DELAYED RELEASE ORAL at 06:34

## 2018-12-16 RX ADMIN — IRON SUPPLEMENT SCH MG: 325 TABLET ORAL at 09:59

## 2018-12-16 RX ADMIN — Medication SCH ML: at 21:24

## 2018-12-16 RX ADMIN — MESALAMINE SCH MG: 400 CAPSULE, DELAYED RELEASE ORAL at 09:58

## 2018-12-16 RX ADMIN — ASPIRIN SCH MG: 81 TABLET, COATED ORAL at 09:59

## 2018-12-16 RX ADMIN — CARVEDILOL SCH MG: 12.5 TABLET, FILM COATED ORAL at 10:00

## 2018-12-16 RX ADMIN — Medication SCH ML: at 10:03

## 2018-12-16 RX ADMIN — ESCITALOPRAM OXALATE SCH MG: 20 TABLET, FILM COATED ORAL at 10:01

## 2018-12-16 NOTE — PN
Date of Progress Note:  12/16/2018



Subjective:  Patient seen and examined.  Chart reviewed and case discussed with RN.  The patient had 
uneventful night.  No significant events reported by nursing staff.  No acute signs of withdrawal.  T
he patient is somewhat more drowsy.  Sister at the bedside.  Treatment plan explained.  All questions
 answered.  The patient having some episodes of confusion per the sister.



Medications:  List reviewed.



Physical Examination:

Vital Signs:  Temperature 97.3, heart rate 86, blood pressure 179/85, respirations 18, O2 97% on room
 air. 

General:  Awake, alert, oriented x3, not in any acute distress.  Appears older than stated age female
. 

CV:  S1, S2.  Regular rate and rhythm.  Peripheral pulses present.  No murmurs. 

Respiratory:  Moving air well bilaterally.  No wheezing or stridor. 

Gastrointestinal:  Abdomen is soft, nontender, nondistended.  Positive bowel sounds. 

Extremities:  No clubbing, cyanosis, or edema. 

Neurologic:  Nonfocal. 

Psych:  Mood is somewhat depressed.  Affect is flat.  Insight and judgment are fair.



Laboratory Data:  Sodium 138, potassium 3.9, chloride 106, CO2 25, BUN 11, creatinine 0.7, glucose 89
, calcium 8.2, magnesium 1.6, albumin 2.9.  WBC 5.4, H and H 9.9 and 27.8, platelets 332, neutrophils
 58%.



Assessment:  A 53-year-old female with:

1.Acute encephalopathy likely related to alcohol and Korsakoff's syndrome.  We will continue Librium
 taper.  We will decrease Librium to q.12 hours.  Continue multivitamins.

2.Alcohol dependence.  Came in with an alcohol level of 11.  Continue to monitor for signs of alcoho
l withdrawal.  Use Ativan p.r.n.  Continue Librium taper.

3.Hypomagnesemia.  Replace and monitor.

4.Hypotension.  We will add IV hydralazine likely due to withdrawal symptoms.

5.Nicotine dependence and cigarette smoking.

6.Gastrointestinal and deep venous thrombosis prophylaxis addressed.

7.Normocytic normochromic anemia, likely dilutional.

8.Hypomagnesemia.  We will replace and monitor.



Plan:  Continue Librium taper.  Family interested in alcohol rehab and we will consult social work fo
r discharge and placement planning.





/ISAC

DD:  12/16/2018 13:53:29Voice ID:  [id number]

DT:  12/16/2018 18:42:40Report ID:  [id number]

## 2018-12-17 VITALS — DIASTOLIC BLOOD PRESSURE: 80 MMHG | SYSTOLIC BLOOD PRESSURE: 143 MMHG | TEMPERATURE: 97.9 F

## 2018-12-17 LAB
ALBUMIN SERPL BCP-MCNC: 3 G/DL (ref 3.4–5)
ALP SERPL-CCNC: 67 U/L (ref 45–117)
ALT SERPL W P-5'-P-CCNC: 19 U/L (ref 12–78)
AST SERPL W P-5'-P-CCNC: 14 U/L (ref 15–37)
BUN BLD-MCNC: 15 MG/DL (ref 7–18)
GLUCOSE SERPLBLD-MCNC: 81 MG/DL (ref 74–106)
HCT VFR BLD CALC: 26.1 % (ref 36–45)
LYMPHOCYTES # SPEC AUTO: 1.8 K/UL (ref 0.7–4.9)
MAGNESIUM SERPL-MCNC: 1.7 MG/DL (ref 1.8–2.4)
MCH RBC QN AUTO: 35.1 PG (ref 27–35)
MCV RBC: 99.1 FL (ref 80–100)
PMV BLD: 7.9 FL (ref 7.6–11.3)
POTASSIUM SERPL-SCNC: 3.8 MMOL/L (ref 3.5–5.1)
RBC # BLD: 2.64 M/UL (ref 3.86–4.86)

## 2018-12-17 RX ADMIN — LOSARTAN POTASSIUM SCH MG: 50 TABLET, FILM COATED ORAL at 09:07

## 2018-12-17 RX ADMIN — IRON SUPPLEMENT SCH MG: 325 TABLET ORAL at 09:08

## 2018-12-17 RX ADMIN — ASPIRIN SCH MG: 81 TABLET, COATED ORAL at 09:07

## 2018-12-17 RX ADMIN — PANTOPRAZOLE SODIUM SCH MG: 40 TABLET, DELAYED RELEASE ORAL at 05:43

## 2018-12-17 RX ADMIN — Medication SCH ML: at 09:09

## 2018-12-17 RX ADMIN — CARVEDILOL SCH MG: 12.5 TABLET, FILM COATED ORAL at 09:07

## 2018-12-17 RX ADMIN — SODIUM CHLORIDE SCH MLS: 9 INJECTION, SOLUTION INTRAVENOUS at 09:08

## 2018-12-17 RX ADMIN — ESCITALOPRAM OXALATE SCH MG: 20 TABLET, FILM COATED ORAL at 09:08

## 2018-12-18 NOTE — DS
Date of Discharge:  12/17/2018



Consultants:  None.



Admitting Diagnoses:  

1.Acute encephalopathy.

2.Hypomagnesemia.

3.Hypertension.

4.Alcohol dependence.



Discharge Diagnoses:  

1.Acute encephalopathy related to alcohol withdrawal and Korsakoff syndrome.

2.Alcohol dependence.

3.Hypertension, improved.

4.Nicotine dependence with cigarette smoking, counseled.

5.Normocytic normochromic anemia.

6.Hypomagnesemia, replaced.



Hospital Course:  The patient is a 53-year-old female with history of hypertension, alcohol dependenc
e, comes in with altered mental status.  The patient was recently discharge from this hospital for si
milar issues.  The patient was found to have an alcohol level of 11.  She was started on Librium tape
r.  She did have some electrolyte abnormalities, which were corrected.  The patient was slowly tapere
d off the Librium.  She did develop anemia, which was likely related to IV fluids and dilutional resu
lts, did not have any active bleeding.  The patient did well overall.  She did not have any acute wit
hdrawal symptoms after being tapered off the Librium.  She was continued on banana bag with multivita
mins.  Sister at the bedside, who was concerned about the patient and they are going to plan for her 
to get her to rehab.  I explained to the patient that she does have some confabulation due to her alc
ohol dependence.  The patient was then cleared for discharge and was sent home in a stable condition.




Activity:  As tolerated.



Medications:  As per medication reconciliation list.



Followup:  Follow up with primary care physician in 2-3 days.  Follow up with psychiatrist in 2-3 day
s.  Return to ER for worsening condition.



Diet:  Heart healthy.



Activity:  Fall precautions.



Physical Examination:

General:  Awake and alert, in no acute distress. 

CV:  S1, S2.  No murmurs. 

Respiratory:  Clear to auscultation bilaterally.  No wheezing. 

Gastrointestinal:  Abdomen is soft, nontender, nondistended.  Positive bowel sounds. 

Extremities:  No clubbing, cyanosis, edema. 

Neurologic:  Nonfocal.





SA/MODL

DD:  12/17/2018 12:58:03Voice ID:  [id number]

DT:  12/18/2018 07:01:07Report ID:  [id number]

## 2021-08-03 LAB
ALBUMIN SERPL BCP-MCNC: 3.6 G/DL (ref 3.4–5)
ALP SERPL-CCNC: 115 U/L (ref 45–117)
ALT SERPL W P-5'-P-CCNC: 21 U/L (ref 12–78)
AMYLASE SERPL-CCNC: 79 U/L (ref 25–115)
AST SERPL W P-5'-P-CCNC: 18 U/L (ref 15–37)
BUN BLD-MCNC: 13 MG/DL (ref 7–18)
GLUCOSE SERPLBLD-MCNC: 94 MG/DL (ref 74–106)
HCT VFR BLD CALC: 27.3 % (ref 36–45)
LIPASE SERPL-CCNC: 151 U/L (ref 73–393)
LYMPHOCYTES # SPEC AUTO: 1.9 K/UL (ref 0.7–4.9)
PMV BLD: 7.2 FL (ref 7.6–11.3)
POTASSIUM SERPL-SCNC: 3.9 MMOL/L (ref 3.5–5.1)
RBC # BLD: 2.87 M/UL (ref 3.86–4.86)

## 2021-08-03 NOTE — EKG
Test Date:    2021-08-03               Test Time:    10:15:42

Technician:   JANNA                                     

                                                     

MEASUREMENT RESULTS:                                       

Intervals:                                           

Rate:         96                                     

NM:           134                                    

QRSD:         74                                     

QT:           358                                    

QTc:          452                                    

Axis:                                                

P:            77                                     

NM:           134                                    

QRS:          73                                     

T:            67                                     

                                                     

INTERPRETIVE STATEMENTS:                                       

                                                     

Normal sinus rhythm

Normal ECG

Compared to ECG 12/14/2018 18:58:04

No significant changes



Electronically Signed On 08-03-21 11:16:12 CDT by Antonino Martin

## 2021-08-03 NOTE — RAD REPORT
EXAM DESCRIPTION:  RAD - Chest Pa And Lat (2 Views) - 8/3/2021 11:07 am

 

CLINICAL HISTORY:  PRE-OP

 

COMPARISON:  Chest Single View dated 12/14/2018; Chest Single View dated 12/10/2018; Chest Pa And Lat
 (2 Views) dated 7/17/2018; Chest Pa And Lat (2 Views) dated 12/10/2016

 

FINDINGS:  No evidence of edema or pneumonia. The heart size is within normal limits.No acute osseous
 abnormality. No significant pleural effusions or pneumothorax.

 

IMPRESSION:  No acute cardiopulmonary disease.

## 2021-08-04 ENCOUNTER — HOSPITAL ENCOUNTER (OUTPATIENT)
Dept: HOSPITAL 97 - OR | Age: 56
Discharge: HOME | End: 2021-08-04
Attending: SURGERY
Payer: COMMERCIAL

## 2021-08-04 VITALS — TEMPERATURE: 97.4 F

## 2021-08-04 VITALS — SYSTOLIC BLOOD PRESSURE: 109 MMHG | DIASTOLIC BLOOD PRESSURE: 85 MMHG | OXYGEN SATURATION: 99 %

## 2021-08-04 DIAGNOSIS — K80.10: Primary | ICD-10-CM

## 2021-08-04 DIAGNOSIS — Z20.822: ICD-10-CM

## 2021-08-04 PROCEDURE — 88304 TISSUE EXAM BY PATHOLOGIST: CPT

## 2021-08-04 PROCEDURE — 85025 COMPLETE CBC W/AUTO DIFF WBC: CPT

## 2021-08-04 PROCEDURE — 84703 CHORIONIC GONADOTROPIN ASSAY: CPT

## 2021-08-04 PROCEDURE — 36415 COLL VENOUS BLD VENIPUNCTURE: CPT

## 2021-08-04 PROCEDURE — 47562 LAPAROSCOPIC CHOLECYSTECTOMY: CPT

## 2021-08-04 PROCEDURE — 0FT44ZZ RESECTION OF GALLBLADDER, PERCUTANEOUS ENDOSCOPIC APPROACH: ICD-10-PCS

## 2021-08-04 PROCEDURE — 71046 X-RAY EXAM CHEST 2 VIEWS: CPT

## 2021-08-04 PROCEDURE — 82150 ASSAY OF AMYLASE: CPT

## 2021-08-04 PROCEDURE — 93005 ELECTROCARDIOGRAM TRACING: CPT

## 2021-08-04 PROCEDURE — 80076 HEPATIC FUNCTION PANEL: CPT

## 2021-08-04 PROCEDURE — 80048 BASIC METABOLIC PNL TOTAL CA: CPT

## 2021-08-04 PROCEDURE — 83690 ASSAY OF LIPASE: CPT

## 2021-08-04 NOTE — DS
Diagnoses:  Symptomatic cholelithiasis, right upper quadrant abdominal pain.



Procedure:  Laparoscopic cholecystectomy.



Disposition:  Home.



Activity:  As tolerated.  No heavy lifting.



Plan:  Follow up in my office in 1 week.  Call for appointment at [phone].  Keep area dry for 48 micheal
rs, then may shower.  Keep Steri-Strips intact.





JENAE/ISAC

DD:  08/04/2021 12:33:31Voice ID:  [id number]

DT:  08/04/2021 12:48:38Report ID:  [id number]

## 2021-08-04 NOTE — OP
Date of Procedure:  08/04/2021



Surgeon:  Ja Almanzar MD



Assistant:  Lilia Jauregui.



Preoperative Diagnoses:  Symptomatic cholelithiasis, right upper quadrant abdominal pain, acute may
cystitis.



Postoperative Diagnoses:  Symptomatic cholelithiasis, right upper quadrant abdominal pain, acute chol
ecystitis.



Procedure:  Laparoscopic cholecystectomy.



Estimated Blood Loss:  Less than 10 mL.



Specimen:  Gallbladder.



Anesthesia:  General plus local.



Indication:  This is the case of a 56-year-old patient with above diagnosis.  Fully explained the briana
efits, alternatives, and risks of laparoscopic possible open cholecystectomy, which include, but not 
limited to infection, bleeding, damage to adjacent structures, anesthesia complication, choledocholit
hiasis, bile leak, pancreatitis, MI and even death.  She also understands this may not relieve any sy
mptoms.  She might need more than one surgical intervention.  She understood, signed a consent.



Procedure In Detail:  The patient was brought to the operating room, placed in supine position.  Anes
thesia was done without complication.  Abdominal area was prepped and draped in the usual sterile fas
hion.  Marcaine 0.5% was injected for local anesthetic followed by sharp incision of the skin in the 
infraumbilical region.  The incision was carried down to fascia, which was opened under direct vision
.  Peritoneum was encountered, opened under direct vision.  Vicryl #1 placed inside the fascia.  Jonah
on trocar was carefully introduced.  Pneumoperitoneum was obtained.  I placed 3 more trocars, 5 mm ea
ch one of them, 1 epigastric area and 2 in the right upper quadrant using same technique, which was c
onsisted of local anesthetic, sharp incision of the skin and introduction of the trocars under direct
 vision.  This allowed me to put a grasper in the fundus of the gallbladder, another grasper in the i
nfundibulum retracting the gallbladder in the inferolateral fashion, exposing the triangle of Calot a
nd obtaining critical view.  Cystic duct and cystic artery were clearly isolated, free circumferentia
lly and a connection between those and the gallbladder were clearly identified.  I proceeded to ligat
e those by using at least 3 clips proximal, 1 clip distal, ligation in middle.  Same was done with th
e cystic artery.  A small little branch of the cystic artery was also ligated using same technique.  
The hepatic arteries and common bile duct were protected at all times.  At that moment, I proceeded t
o remove the gallbladder from liver using Bovie cauterizer and removed from abdominal cavity using an
 EndoCatch through the umbilical incision.  The area was inspected once again.  No bile leak, no blee
ding.  At that moment, I proceeded to remove the trocars under direct vision.  Deflated pneumoperiton
eum, closed the fascia with #1 Vicryl.  Irrigated subcutaneous tissue, closed that with 3-0 chromic a
nd skin with 3-0 chromic.  Sponge count and instrument counts correct.  The patient tolerated the pro
cedure well.  The patient was sent to recovery in stable condition.





JENAE/ISAC

DD:  08/04/2021 12:33:31Voice ID:  [id number]

DT:  08/04/2021 12:47:04Report ID:  [id number]

## 2021-08-04 NOTE — P.BOP
Preoperative diagnosis: Symptomatic cholelithiasis, RUQ abd pain


Postoperative diagnosis: same


Primary procedure: Laparoscopic cholecystectomy


Assistant: PERLITA KESSLER (RNFA)


Estimated blood loss: <10cc


Specimen: gb


Findings: as above


Anesthesia: General


Complications: None


Transferred to: Recovery Room


Condition: Good
